# Patient Record
Sex: FEMALE | Race: WHITE | Employment: OTHER | URBAN - METROPOLITAN AREA
[De-identification: names, ages, dates, MRNs, and addresses within clinical notes are randomized per-mention and may not be internally consistent; named-entity substitution may affect disease eponyms.]

---

## 2020-12-14 PROBLEM — S72.141A CLOSED INTERTROCHANTERIC FRACTURE OF HIP, RIGHT, INITIAL ENCOUNTER (HCC): Status: ACTIVE | Noted: 2020-12-14

## 2021-12-31 ENCOUNTER — HOSPITAL ENCOUNTER (INPATIENT)
Age: 76
LOS: 1 days | Discharge: LEFT BEFORE DISCHARGE | DRG: 871 | End: 2022-01-01
Attending: EMERGENCY MEDICINE | Admitting: STUDENT IN AN ORGANIZED HEALTH CARE EDUCATION/TRAINING PROGRAM
Payer: MEDICARE

## 2021-12-31 ENCOUNTER — HOSPITAL ENCOUNTER (INPATIENT)
Dept: INTERVENTIONAL RADIOLOGY/VASCULAR | Age: 76
Discharge: HOME | DRG: 871 | End: 2021-12-31
Attending: PHYSICIAN ASSISTANT
Payer: MEDICARE

## 2021-12-31 VITALS
DIASTOLIC BLOOD PRESSURE: 72 MMHG | HEART RATE: 93 BPM | RESPIRATION RATE: 20 BRPM | SYSTOLIC BLOOD PRESSURE: 118 MMHG | OXYGEN SATURATION: 97 %

## 2021-12-31 DIAGNOSIS — N17.9 SEPSIS WITH ACUTE RENAL FAILURE WITHOUT SEPTIC SHOCK, DUE TO UNSPECIFIED ORGANISM, UNSPECIFIED ACUTE RENAL FAILURE TYPE (HCC): ICD-10-CM

## 2021-12-31 DIAGNOSIS — N17.9 AKI (ACUTE KIDNEY INJURY) (HCC): ICD-10-CM

## 2021-12-31 DIAGNOSIS — A41.9 SEPSIS WITH ACUTE RENAL FAILURE WITHOUT SEPTIC SHOCK, DUE TO UNSPECIFIED ORGANISM, UNSPECIFIED ACUTE RENAL FAILURE TYPE (HCC): ICD-10-CM

## 2021-12-31 DIAGNOSIS — R65.20 SEPSIS WITH ACUTE RENAL FAILURE WITHOUT SEPTIC SHOCK, DUE TO UNSPECIFIED ORGANISM, UNSPECIFIED ACUTE RENAL FAILURE TYPE (HCC): ICD-10-CM

## 2021-12-31 DIAGNOSIS — N13.9 OBSTRUCTIVE UROPATHY: Primary | ICD-10-CM

## 2021-12-31 PROBLEM — N12 PYELONEPHRITIS: Status: ACTIVE | Noted: 2021-12-31

## 2021-12-31 PROBLEM — E87.6 HYPOKALEMIA: Status: ACTIVE | Noted: 2021-12-31

## 2021-12-31 PROBLEM — E86.0 DEHYDRATION: Status: ACTIVE | Noted: 2021-12-31

## 2021-12-31 PROBLEM — N17.0 ATN (ACUTE TUBULAR NECROSIS) (HCC): Status: ACTIVE | Noted: 2021-12-31

## 2021-12-31 PROBLEM — D72.829 LEUCOCYTOSIS: Status: ACTIVE | Noted: 2021-12-31

## 2021-12-31 LAB
ALBUMIN SERPL-MCNC: 2.4 G/DL (ref 3.4–5)
ALBUMIN/GLOB SERPL: 0.7 {RATIO} (ref 0.8–1.7)
ALP SERPL-CCNC: 128 U/L (ref 45–117)
ALT SERPL-CCNC: 9 U/L (ref 13–56)
ANION GAP SERPL CALC-SCNC: 5 MMOL/L (ref 3–18)
APPEARANCE UR: ABNORMAL
APTT PPP: 32.3 SEC (ref 23–36.4)
AST SERPL-CCNC: 10 U/L (ref 10–38)
BACTERIA URNS QL MICRO: ABNORMAL /HPF
BASOPHILS # BLD: 0 K/UL (ref 0–0.1)
BASOPHILS NFR BLD: 0 % (ref 0–2)
BILIRUB SERPL-MCNC: 0.5 MG/DL (ref 0.2–1)
BILIRUB UR QL: NEGATIVE
BUN SERPL-MCNC: 25 MG/DL (ref 7–18)
BUN/CREAT SERPL: 16 (ref 12–20)
CALCIUM SERPL-MCNC: 8.2 MG/DL (ref 8.5–10.1)
CHLORIDE SERPL-SCNC: 113 MMOL/L (ref 100–111)
CO2 SERPL-SCNC: 20 MMOL/L (ref 21–32)
COLOR UR: YELLOW
CREAT SERPL-MCNC: 1.55 MG/DL (ref 0.6–1.3)
DIFFERENTIAL METHOD BLD: ABNORMAL
EOSINOPHIL # BLD: 0 K/UL (ref 0–0.4)
EOSINOPHIL NFR BLD: 0 % (ref 0–5)
EPITH CASTS URNS QL MICRO: ABNORMAL /LPF (ref 0–5)
ERYTHROCYTE [DISTWIDTH] IN BLOOD BY AUTOMATED COUNT: 13.7 % (ref 11.6–14.5)
GLOBULIN SER CALC-MCNC: 3.4 G/DL (ref 2–4)
GLUCOSE SERPL-MCNC: 133 MG/DL (ref 74–99)
GLUCOSE UR STRIP.AUTO-MCNC: NEGATIVE MG/DL
HCT VFR BLD AUTO: 37.1 % (ref 35–45)
HGB BLD-MCNC: 12.2 G/DL (ref 12–16)
HGB UR QL STRIP: ABNORMAL
IMM GRANULOCYTES # BLD AUTO: 0 K/UL (ref 0–0.04)
IMM GRANULOCYTES NFR BLD AUTO: 0 % (ref 0–0.5)
KETONES UR QL STRIP.AUTO: NEGATIVE MG/DL
LEUKOCYTE ESTERASE UR QL STRIP.AUTO: ABNORMAL
LYMPHOCYTES # BLD: 0.5 K/UL (ref 0.9–3.6)
LYMPHOCYTES NFR BLD: 3 % (ref 21–52)
MCH RBC QN AUTO: 29.5 PG (ref 24–34)
MCHC RBC AUTO-ENTMCNC: 32.9 G/DL (ref 31–37)
MCV RBC AUTO: 89.8 FL (ref 78–100)
MONOCYTES # BLD: 1.4 K/UL (ref 0.05–1.2)
MONOCYTES NFR BLD: 8 % (ref 3–10)
NEUTS BAND NFR BLD MANUAL: 1 %
NEUTS SEG # BLD: 15.9 K/UL (ref 1.8–8)
NEUTS SEG NFR BLD: 88 % (ref 40–73)
NITRITE UR QL STRIP.AUTO: POSITIVE
NRBC # BLD: 0 K/UL (ref 0–0.01)
NRBC BLD-RTO: 0 PER 100 WBC
PH UR STRIP: >8.5 [PH] (ref 5–8)
PLATELET # BLD AUTO: 136 K/UL (ref 135–420)
PLATELET COMMENTS,PCOM: ABNORMAL
PMV BLD AUTO: 10.2 FL (ref 9.2–11.8)
POTASSIUM SERPL-SCNC: 3.3 MMOL/L (ref 3.5–5.5)
PROT SERPL-MCNC: 5.8 G/DL (ref 6.4–8.2)
PROT UR STRIP-MCNC: 100 MG/DL
RBC # BLD AUTO: 4.13 M/UL (ref 4.2–5.3)
RBC #/AREA URNS HPF: NEGATIVE /HPF (ref 0–5)
RBC MORPH BLD: ABNORMAL
SODIUM SERPL-SCNC: 138 MMOL/L (ref 136–145)
SP GR UR REFRACTOMETRY: 1.02 (ref 1–1.03)
UROBILINOGEN UR QL STRIP.AUTO: 1 EU/DL (ref 0.2–1)
WBC # BLD AUTO: 17.8 K/UL (ref 4.6–13.2)
WBC URNS QL MICRO: ABNORMAL /HPF (ref 0–4)

## 2021-12-31 PROCEDURE — 87077 CULTURE AEROBIC IDENTIFY: CPT

## 2021-12-31 PROCEDURE — 80053 COMPREHEN METABOLIC PANEL: CPT

## 2021-12-31 PROCEDURE — 74011250637 HC RX REV CODE- 250/637: Performed by: UROLOGY

## 2021-12-31 PROCEDURE — 74011000636 HC RX REV CODE- 636: Performed by: RADIOLOGY

## 2021-12-31 PROCEDURE — 85025 COMPLETE CBC W/AUTO DIFF WBC: CPT

## 2021-12-31 PROCEDURE — 87186 SC STD MICRODIL/AGAR DIL: CPT

## 2021-12-31 PROCEDURE — 85730 THROMBOPLASTIN TIME PARTIAL: CPT

## 2021-12-31 PROCEDURE — 99223 1ST HOSP IP/OBS HIGH 75: CPT | Performed by: STUDENT IN AN ORGANIZED HEALTH CARE EDUCATION/TRAINING PROGRAM

## 2021-12-31 PROCEDURE — 87205 SMEAR GRAM STAIN: CPT

## 2021-12-31 PROCEDURE — 87075 CULTR BACTERIA EXCEPT BLOOD: CPT

## 2021-12-31 PROCEDURE — 99284 EMERGENCY DEPT VISIT MOD MDM: CPT

## 2021-12-31 PROCEDURE — 74011250636 HC RX REV CODE- 250/636: Performed by: INTERNAL MEDICINE

## 2021-12-31 PROCEDURE — 74011250636 HC RX REV CODE- 250/636: Performed by: PHYSICIAN ASSISTANT

## 2021-12-31 PROCEDURE — 74011000250 HC RX REV CODE- 250: Performed by: STUDENT IN AN ORGANIZED HEALTH CARE EDUCATION/TRAINING PROGRAM

## 2021-12-31 PROCEDURE — 65270000029 HC RM PRIVATE

## 2021-12-31 PROCEDURE — 74011000250 HC RX REV CODE- 250: Performed by: RADIOLOGY

## 2021-12-31 PROCEDURE — 87086 URINE CULTURE/COLONY COUNT: CPT

## 2021-12-31 PROCEDURE — 74011250637 HC RX REV CODE- 250/637: Performed by: STUDENT IN AN ORGANIZED HEALTH CARE EDUCATION/TRAINING PROGRAM

## 2021-12-31 PROCEDURE — 74011250636 HC RX REV CODE- 250/636: Performed by: STUDENT IN AN ORGANIZED HEALTH CARE EDUCATION/TRAINING PROGRAM

## 2021-12-31 PROCEDURE — 81001 URINALYSIS AUTO W/SCOPE: CPT

## 2021-12-31 PROCEDURE — 50432 PLMT NEPHROSTOMY CATHETER: CPT

## 2021-12-31 PROCEDURE — 2709999900 HC NON-CHARGEABLE SUPPLY

## 2021-12-31 PROCEDURE — 0T9130Z DRAINAGE OF LEFT KIDNEY WITH DRAINAGE DEVICE, PERCUTANEOUS APPROACH: ICD-10-PCS | Performed by: SURGERY

## 2021-12-31 RX ORDER — LIDOCAINE HYDROCHLORIDE 10 MG/ML
30 INJECTION, SOLUTION EPIDURAL; INFILTRATION; INTRACAUDAL; PERINEURAL ONCE
Status: COMPLETED | OUTPATIENT
Start: 2021-12-31 | End: 2021-12-31

## 2021-12-31 RX ORDER — POTASSIUM CHLORIDE 20 MEQ/1
20 TABLET, EXTENDED RELEASE ORAL 2 TIMES DAILY
Status: DISCONTINUED | OUTPATIENT
Start: 2021-12-31 | End: 2021-12-31

## 2021-12-31 RX ORDER — MIDAZOLAM HYDROCHLORIDE 1 MG/ML
.5-2 INJECTION, SOLUTION INTRAMUSCULAR; INTRAVENOUS
Status: DISPENSED | OUTPATIENT
Start: 2021-12-31 | End: 2021-12-31

## 2021-12-31 RX ORDER — ACETAMINOPHEN 650 MG/1
650 SUPPOSITORY RECTAL
Status: DISCONTINUED | OUTPATIENT
Start: 2021-12-31 | End: 2022-01-01 | Stop reason: HOSPADM

## 2021-12-31 RX ORDER — MORPHINE SULFATE 2 MG/ML
2 INJECTION, SOLUTION INTRAMUSCULAR; INTRAVENOUS
Status: DISCONTINUED | OUTPATIENT
Start: 2021-12-31 | End: 2022-01-01 | Stop reason: HOSPADM

## 2021-12-31 RX ORDER — SODIUM CHLORIDE 0.9 % (FLUSH) 0.9 %
5-40 SYRINGE (ML) INJECTION AS NEEDED
Status: DISCONTINUED | OUTPATIENT
Start: 2021-12-31 | End: 2022-01-01 | Stop reason: HOSPADM

## 2021-12-31 RX ORDER — CITALOPRAM 20 MG/1
20 TABLET, FILM COATED ORAL DAILY
Status: DISCONTINUED | OUTPATIENT
Start: 2021-12-31 | End: 2022-01-01 | Stop reason: HOSPADM

## 2021-12-31 RX ORDER — IODIXANOL 320 MG/ML
50 INJECTION, SOLUTION INTRAVASCULAR
Status: COMPLETED | OUTPATIENT
Start: 2021-12-31 | End: 2021-12-31

## 2021-12-31 RX ORDER — METOPROLOL TARTRATE 50 MG/1
50 TABLET ORAL EVERY 12 HOURS
Status: DISCONTINUED | OUTPATIENT
Start: 2021-12-31 | End: 2022-01-01 | Stop reason: HOSPADM

## 2021-12-31 RX ORDER — NALOXONE HYDROCHLORIDE 0.4 MG/ML
0.2 INJECTION, SOLUTION INTRAMUSCULAR; INTRAVENOUS; SUBCUTANEOUS
Status: DISCONTINUED | OUTPATIENT
Start: 2021-12-31 | End: 2022-01-04 | Stop reason: HOSPADM

## 2021-12-31 RX ORDER — SODIUM CHLORIDE 9 MG/ML
100 INJECTION, SOLUTION INTRAVENOUS CONTINUOUS
Status: DISCONTINUED | OUTPATIENT
Start: 2021-12-31 | End: 2021-12-31

## 2021-12-31 RX ORDER — ATORVASTATIN CALCIUM 20 MG/1
20 TABLET, FILM COATED ORAL
Status: DISCONTINUED | OUTPATIENT
Start: 2021-12-31 | End: 2022-01-01 | Stop reason: HOSPADM

## 2021-12-31 RX ORDER — SODIUM CHLORIDE AND POTASSIUM CHLORIDE .9; .15 G/100ML; G/100ML
SOLUTION INTRAVENOUS CONTINUOUS
Status: DISCONTINUED | OUTPATIENT
Start: 2021-12-31 | End: 2022-01-01 | Stop reason: HOSPADM

## 2021-12-31 RX ORDER — ACETAMINOPHEN 325 MG/1
650 TABLET ORAL
Status: DISCONTINUED | OUTPATIENT
Start: 2021-12-31 | End: 2022-01-01 | Stop reason: HOSPADM

## 2021-12-31 RX ORDER — ONDANSETRON 2 MG/ML
4 INJECTION INTRAMUSCULAR; INTRAVENOUS
Status: DISCONTINUED | OUTPATIENT
Start: 2021-12-31 | End: 2022-01-01 | Stop reason: HOSPADM

## 2021-12-31 RX ORDER — OXYBUTYNIN CHLORIDE 5 MG/1
5 TABLET ORAL 2 TIMES DAILY
Status: DISCONTINUED | OUTPATIENT
Start: 2021-12-31 | End: 2022-01-01 | Stop reason: HOSPADM

## 2021-12-31 RX ORDER — POLYETHYLENE GLYCOL 3350 17 G/17G
17 POWDER, FOR SOLUTION ORAL DAILY PRN
Status: DISCONTINUED | OUTPATIENT
Start: 2021-12-31 | End: 2022-01-01 | Stop reason: HOSPADM

## 2021-12-31 RX ORDER — LIDOCAINE HYDROCHLORIDE 10 MG/ML
INJECTION, SOLUTION EPIDURAL; INFILTRATION; INTRACAUDAL; PERINEURAL
Status: DISCONTINUED
Start: 2021-12-31 | End: 2022-01-01 | Stop reason: HOSPADM

## 2021-12-31 RX ORDER — SODIUM CHLORIDE 0.9 % (FLUSH) 0.9 %
5-40 SYRINGE (ML) INJECTION EVERY 8 HOURS
Status: DISCONTINUED | OUTPATIENT
Start: 2021-12-31 | End: 2022-01-01 | Stop reason: HOSPADM

## 2021-12-31 RX ORDER — TAMSULOSIN HYDROCHLORIDE 0.4 MG/1
0.4 CAPSULE ORAL DAILY
Status: DISCONTINUED | OUTPATIENT
Start: 2021-12-31 | End: 2022-01-01 | Stop reason: HOSPADM

## 2021-12-31 RX ORDER — FENTANYL CITRATE 50 UG/ML
12.5-5 INJECTION, SOLUTION INTRAMUSCULAR; INTRAVENOUS
Status: DISPENSED | OUTPATIENT
Start: 2021-12-31 | End: 2021-12-31

## 2021-12-31 RX ORDER — ONDANSETRON 4 MG/1
4 TABLET, ORALLY DISINTEGRATING ORAL
Status: DISCONTINUED | OUTPATIENT
Start: 2021-12-31 | End: 2022-01-01 | Stop reason: HOSPADM

## 2021-12-31 RX ORDER — FLUMAZENIL 0.1 MG/ML
0.2 INJECTION INTRAVENOUS
Status: DISCONTINUED | OUTPATIENT
Start: 2021-12-31 | End: 2022-01-01 | Stop reason: HOSPADM

## 2021-12-31 RX ADMIN — SODIUM CHLORIDE 100 ML/HR: 900 INJECTION, SOLUTION INTRAVENOUS at 08:30

## 2021-12-31 RX ADMIN — MIDAZOLAM HYDROCHLORIDE 1 MG: 1 INJECTION, SOLUTION INTRAMUSCULAR; INTRAVENOUS at 12:10

## 2021-12-31 RX ADMIN — IODIXANOL 50 ML: 320 INJECTION, SOLUTION INTRAVASCULAR at 12:15

## 2021-12-31 RX ADMIN — OXYBUTYNIN CHLORIDE 5 MG: 5 TABLET ORAL at 17:09

## 2021-12-31 RX ADMIN — MIDAZOLAM HYDROCHLORIDE 1 MG: 1 INJECTION, SOLUTION INTRAMUSCULAR; INTRAVENOUS at 12:00

## 2021-12-31 RX ADMIN — ATORVASTATIN CALCIUM 20 MG: 20 TABLET, FILM COATED ORAL at 21:16

## 2021-12-31 RX ADMIN — FENTANYL CITRATE 50 MCG: 50 INJECTION INTRAMUSCULAR; INTRAVENOUS at 12:00

## 2021-12-31 RX ADMIN — METOPROLOL TARTRATE 50 MG: 50 TABLET, FILM COATED ORAL at 08:28

## 2021-12-31 RX ADMIN — CITALOPRAM HYDROBROMIDE 20 MG: 20 TABLET ORAL at 08:27

## 2021-12-31 RX ADMIN — TAMSULOSIN HYDROCHLORIDE 0.4 MG: 0.4 CAPSULE ORAL at 08:27

## 2021-12-31 RX ADMIN — Medication 10 ML: at 21:19

## 2021-12-31 RX ADMIN — Medication 10 ML: at 14:09

## 2021-12-31 RX ADMIN — WATER 1 G: 1 INJECTION INTRAMUSCULAR; INTRAVENOUS; SUBCUTANEOUS at 08:28

## 2021-12-31 RX ADMIN — LIDOCAINE HYDROCHLORIDE 30 ML: 10 INJECTION, SOLUTION EPIDURAL; INFILTRATION; INTRACAUDAL; PERINEURAL at 12:00

## 2021-12-31 RX ADMIN — POTASSIUM CHLORIDE AND SODIUM CHLORIDE: 900; 150 INJECTION, SOLUTION INTRAVENOUS at 17:09

## 2021-12-31 RX ADMIN — FENTANYL CITRATE 50 MCG: 50 INJECTION INTRAMUSCULAR; INTRAVENOUS at 12:10

## 2021-12-31 RX ADMIN — METOPROLOL TARTRATE 50 MG: 50 TABLET, FILM COATED ORAL at 21:16

## 2021-12-31 NOTE — ROUTINE PROCESS
TRANSFER - IN REPORT:    Verbal report received from Zachariah (name) on Shawn Restrepo  being received from [facility](unit) for routine progression of care      Report consisted of patients Situation, Background, Assessment and   Recommendations(SBAR). Information from the following report(s) SBAR, Kardex, Intake/Output and Recent Results was reviewed with the receiving nurse. Opportunity for questions and clarification was provided. Assessment completed upon patients arrival to unit and care assumed.

## 2021-12-31 NOTE — PROGRESS NOTES
TRANSFER - OUT REPORT:    Verbal report given to [facility] ELVIN VIEIRA RN(name) on Stephen Espinoza  being transferred to (unit) for routine post - op       Report consisted of patients Situation, Background, Assessment and   Recommendations(SBAR). Information from the following report(s) SBAR, Kardex, Procedure Summary and MAR was reviewed with the receiving nurse. Lines:   Peripheral IV 12/31/21 Left Antecubital (Active)       Peripheral IV 12/31/21 Right Hand (Active)   Site Assessment Clean, dry, & intact 12/31/21 0537   Phlebitis Assessment 0 12/31/21 0537   Infiltration Assessment 0 12/31/21 0537   Dressing Status Clean, dry, & intact 12/31/21 0537   Dressing Type 4 X 4;Tape;Transparent 12/31/21 0537   Hub Color/Line Status Blue;Flushed;Patent 12/31/21 2250   Action Taken Blood drawn; Wrapped 12/31/21 0537   Alcohol Cap Used No 12/31/21 0537        Opportunity for questions and clarification was provided.       Patient transported with:   Registered Nurse  Tech

## 2021-12-31 NOTE — PROGRESS NOTES
Follow up rounding. She was admitted this AM by Dr. Nikita Brown. She has been transferred from [facility] for obstructive nephropathy   She is s/p left PCN placement per IR  Finding include pyelonephritis with pus aspirated from left kidney. Nephrology has been consulted for further care. She remains on IV fluid, IV antibiotics.  Culture follow up for antibiotic selection  Urology to follow up for renal stone       Signed By: Flash Rodrigez MD     December 31, 2021

## 2021-12-31 NOTE — PROGRESS NOTES
completed the initial Spiritual Assessment of the patient in bed H3 of the emergency room and offered Pastoral Care support as well as prayer. Patient does not have an advance directive at this time. Patient does not have any Episcopal/cultural needs that will affect patients preferences in health care. Chaplains will continue to follow and will provide pastoral care on an as needed/requested basis.     [facility] Department  [phone]

## 2021-12-31 NOTE — PROCEDURES
RADIOLOGY POST PROCEDURE NOTE     December 31, 2021       2:25 PM     Preoperative Diagnosis:   Pyelonephritis    Postoperative Diagnosis:  Same. : Mir Choi MD    Assistant:  None. Type of Anesthesia: Moderate Sedation    Procedure/Description:  Left PCN placement    Findings:   Pyelonephritis with 30cc of pus aspirated from the kidney. 10Fr drain placed. Estimated blood Loss:  None    Specimen Removed:   yes    Blood transfusions:  None. Implants:  None. Complications: None    Condition: Stable    Discharge Plan:  continue present therapy    ALTA Choi MD  Interventional Radiology  12/31/21  2:25 PM

## 2021-12-31 NOTE — ED NOTES
Pt's O2 decreased to 79% while pt sleeping. Placed pt on 2 liters NC.   Pt's O2 sats increased to 94%

## 2021-12-31 NOTE — ED NOTES
TRANSFER - OUT REPORT:    Verbal report given to Colleen(name) on Eduardo Robert  being transferred to [facility](unit) for routine progression of care       Report consisted of patients Situation, Background, Assessment and   Recommendations(SBAR). Information from the following report(s) SBAR, ED Summary and MAR was reviewed with the receiving nurse. Lines:   Peripheral IV 12/31/21 Left Antecubital (Active)       Peripheral IV 12/31/21 Right Hand (Active)   Site Assessment Clean, dry, & intact 12/31/21 0537   Phlebitis Assessment 0 12/31/21 0537   Infiltration Assessment 0 12/31/21 0537   Dressing Status Clean, dry, & intact 12/31/21 0537   Dressing Type 4 X 4;Tape;Transparent 12/31/21 0537   Hub Color/Line Status Blue;Flushed;Patent 12/31/21 4020   Action Taken Blood drawn; Wrapped 12/31/21 0537   Alcohol Cap Used No 12/31/21 0537        Opportunity for questions and clarification was provided.       Patient transported with:   [employer]

## 2021-12-31 NOTE — H&P
History & Physical    Patient: Patience Houston MRN: [id number]  CSN: [id number]    YOB: 1945  Age: 64 y.o. Sex: female      DOA: 12/31/2021    Chief Complaint:   Chief Complaint   Patient presents with    Acute Kidney Injury          HPI:     Patience Houston is a 64 y.o.  female with hx of bladder cancer status post urostomy, sick sinus syndrome status post pacemaker, hypertension, COPD. Patient presented to [facility] ED from [facility] in [facility]. She was in ED to ED transfer as there was no available beds in that facility. Patient has been experiencing approximately 3 days of nausea vomiting anorexia. CT abdomen pelvis showed 5 mm stone within the left ureter. Abnormal appearance to the left kidney with patchy striations. Findings could reflect decreased renal function from obstruction or possible ATN. Dr. Keyana Quezada (urology) contacted by ED. Upon interview, patient is currently comfortable and without acute concerns. She did desat to 79% while sleeping and was placed on 2 L nasal cannula with improvement up to 94%. Past Medical History:   Diagnosis Date    Bladder cancer ([address]     Chronic obstructive pulmonary disease ([address]     Depression     Hypertension     Sick sinus syndrome (HCC)        Past Surgical History:   Procedure Laterality Date    DILATION AND CURETTAGE      HX APPENDECTOMY      HX UROLOGICAL      Bladder resection with ileal conduit reconstruction for ureterostomy's       Family History   Problem Relation Age of Onset    Hypertension Mother     Stroke Mother    [facility] COPD Mother     Kidney Disease Mother     Cancer Father         Prostate    Hypertension Sister     Other Brother         Hodgkin's lymphoma       Social History     Socioeconomic History    Marital status: SINGLE   Tobacco Use    Smoking status: Current Every Day Smoker     Packs/day: 1.50    Tobacco comment: since 14 yrs. old   Substance and Sexual Activity    Alcohol use: Yes     Comment: occational       Prior to Admission medications    Medication Sig Start Date End Date Taking? Authorizing Provider   acetaminophen (TYLENOL) 500 mg tablet Take 1 Tab by mouth every six (6) hours as needed for Pain (mild pain). 12/21/20   Fabiola Bronson MD   aspirin delayed-release 81 mg tablet Take 1 Tab by mouth two (2) times daily (after meals). Indications: extended dvt prophylaxis 12/29/20   Fabiola Bronson MD   atorvastatin (LIPITOR) 20 mg tablet Take 1 Tab by mouth nightly. 12/21/20   Fabiola Bronson MD   citalopram (CELEXA) 20 mg tablet Take 1 Tab by mouth daily. 12/22/20   Carmella Peter MD   metoprolol tartrate (LOPRESSOR) 50 mg tablet Take 1 Tab by mouth every twelve (12) hours. 12/21/20   Fabiola Bronson MD   polyethylene glycol (MIRALAX) 17 gram packet Take 1 Packet by mouth daily as needed for Constipation. 12/21/20   Carmella Peter MD   oxybutynin (DITROPAN) 5 mg tablet Take 1 Tab by mouth two (2) times a day. 1/11/13   Becca Ivory MD   clobetasol (TEMOVATE) 0.05 % ointment Apply  to affected area two (2) times daily as needed for Skin Irritation or Itching. 1/11/13   Amadou Sutton MD   phenazopyridine (PYRIDIUM) 200 mg tablet 1TIDPRNPA - TAKE ONE TABLET BY MOUTH THREE TIMES DAILY AS NEEDED FOR PAIN 1/2/11   Becca Ivory MD       Allergies   Allergen Reactions    Tetracycline Unknown (comments)         Review of Systems  GENERAL: No fever, chills, malaise  HEENT: No sore throat or sinus congestion. NECK: No pain or stiffness. PULMONARY: No shortness of breath, cough or wheeze. Cardiovascular: no CP  GASTROINTESTINAL: No abdominal pain, no current nausea, no current vomiting  GENITOURINARY: No urinary frequency,  dysuria. MUSCULOSKELETAL: No arthralgias  DERMATOLOGIC: No rash, no itching, no lesions. ENDOCRINE:  No recent change in weight. HEMATOLOGICAL: No anemia or easy bruising or bleeding.    NEUROLOGIC: No headache, seizures, numbness, tingling or weakness. Physical Exam:     Physical Exam:  Visit Vitals  BP (!) 142/69 (BP 1 Location: Right upper arm, BP Patient Position: At rest)   Pulse (!) 108   Temp 98.5 °F (36.9 °C)   Resp 18   Ht 5' 6\" (1.676 m)   Wt 63.5 kg (140 lb)   SpO2 91%   BMI 22.60 kg/m²      O2 Device: None (Room air)    Temp (24hrs), Av.5 °F (36.9 °C), Min:98.5 °F (36.9 °C), Max:98.5 °F (36.9 °C)    No intake/output data recorded. No intake/output data recorded. General:  Alert, cooperative, no distress, appears stated age. Head: Normocephalic, without obvious abnormality, atraumatic. Eyes:  Conjunctivae/corneas clear. PERRL, EOMs intact. Nose: Nares normal.    Neck: Supple, symmetrical, trachea midline   Lungs:   Clear to auscultation bilaterally. Decreased air movement. On 2 L nasal cannula. Heart:  Regular rate and rhythm, S1, S2 normal.     Abdomen: Soft, non-tender. Nondistended   Extremities: Extremities normal, atraumatic, no cyanosis or edema. Pulses: 2+ and symmetric all extremities. Skin:  No rashes or lesions   Neurologic: AAOx3, No focal motor or sensory deficit. Labs Reviewed: All lab results for the last 24 hours reviewed. Recent Results (from the past 24 hour(s))   CBC WITH AUTOMATED DIFF    Collection Time: 21  5:17 AM   Result Value Ref Range    WBC 17.8 (H) 4.6 - 13.2 K/uL    RBC 4.13 (L) 4.20 - 5.30 M/uL    HGB 12.2 12.0 - 16.0 g/dL    HCT 37.1 35.0 - 45.0 %    MCV 89.8 78.0 - 100.0 FL    MCH 29.5 24.0 - 34.0 PG    MCHC 32.9 31.0 - 37.0 g/dL    RDW 13.7 11.6 - 14.5 %    PLATELET 746 603 - 393 K/uL    MPV 10.2 9.2 - 11.8 FL    NRBC 0.0 0  WBC    ABSOLUTE NRBC 0.00 0.00 - 0.01 K/uL    NEUTROPHILS 88 (H) 40 - 73 %    BAND NEUTROPHILS 1 %    LYMPHOCYTES 3 (L) 21 - 52 %    MONOCYTES 8 3 - 10 %    EOSINOPHILS 0 0 - 5 %    BASOPHILS 0 0 - 2 %    IMMATURE GRANULOCYTES 0 0.0 - 0.5 %    ABS. NEUTROPHILS 15.9 (H) 1.8 - 8.0 K/UL    ABS.  LYMPHOCYTES 0.5 (L) 0.9 - 3.6 K/UL    ABS. MONOCYTES 1.4 (H) 0.05 - 1.2 K/UL    ABS. EOSINOPHILS 0.0 0.0 - 0.4 K/UL    ABS. BASOPHILS 0.0 0.0 - 0.1 K/UL    ABS. IMM. GRANS. 0.0 0.00 - 0.04 K/UL    DF MANUAL      PLATELET COMMENTS ADEQUATE PLATELETS      RBC COMMENTS NORMOCYTIC, NORMOCHROMIC     METABOLIC PANEL, COMPREHENSIVE    Collection Time: 12/31/21  5:17 AM   Result Value Ref Range    Sodium 138 136 - 145 mmol/L    Potassium 3.3 (L) 3.5 - 5.5 mmol/L    Chloride 113 (H) 100 - 111 mmol/L    CO2 20 (L) 21 - 32 mmol/L    Anion gap 5 3.0 - 18 mmol/L    Glucose 133 (H) 74 - 99 mg/dL    BUN 25 (H) 7.0 - 18 MG/DL    Creatinine 1.55 (H) 0.6 - 1.3 MG/DL    BUN/Creatinine ratio 16 12 - 20      GFR est AA 39 (L) >60 ml/min/1.73m2    GFR est non-AA 32 (L) >60 ml/min/1.73m2    Calcium 8.2 (L) 8.5 - 10.1 MG/DL    Bilirubin, total 0.5 0.2 - 1.0 MG/DL    ALT (SGPT) 9 (L) 13 - 56 U/L    AST (SGOT) 10 10 - 38 U/L    Alk. phosphatase 128 (H) 45 - 117 U/L    Protein, total 5.8 (L) 6.4 - 8.2 g/dL    Albumin 2.4 (L) 3.4 - 5.0 g/dL    Globulin 3.4 2.0 - 4.0 g/dL    A-G Ratio 0.7 (L) 0.8 - 1.7          XR Results (most recent):  Results from Hospital Encounter encounter on 12/13/20    XR FLUOROSCOPY UNDER 60 MINUTES    Narrative  XR FLUOROSCOPY UNDER 60 MINUTES    INDICATION: RT HIP.  COMPARISON: No relevant studies. Fluoroscopic time: 59.2 seconds  Images: 2    Impression  FINDINGS/IMPRESSION:    Fluoroscopic assistance was provided. Radiologist was not present during the  procedure. Status post internal fixation of right femur with femoral neck screw  and short intramedullary stem with distal interlocking screw. Please refer to  operative note for additional findings.         CT Results  (Last 48 hours)    STUDY: CT of the abdomen and pelvis without IV contrast     INDICATION: Left flank pain     COMPARISON: 1/22/2020     FINDINGS: Routine CT images are obtained through the abdomen and pelvis without IV contrast. However, history does indicate the patient had a prior CT scan in the last couple days although not available on PACS archives. The liver, spleen, pancreas, gallbladder, adrenal glands appear unremarkable. The right kidney is unremarkable. There is a markedly abnormal appearance to the left kidney. There is patchy striated appearance with high density probably reflecting prior contrast. There is an approximate 5 mm stone within the left mid ureter with mild obstructive changes. The patient is status post cystectomy with ilial conduit. The bowel loops are nondistended. There is contrast within portions of the bowel presumed from prior diagnostic CT. Procedures/imaging: see electronic medical records for all procedures/Xrays and details which were not copied into this note but were reviewed prior to creation of Plan      Assessment/Plan     Active Problems:    Obstructive uropathy (12/31/2021)       Assessment  1. Sepsis -tachycardia, leukocytosis with probable renal source  2. Started uropathy -patient with 5 mm stone in left ureter  3. Elevated creatinine on probable CKD 3a. Creatinine 1.4 on December 28 [facility] records. 4. History of bladder cancer status post cystectomy with ilial conduit   5. Hypokalemia   #1-5. Admit. Start Rocephin. Nephrology consultation. CT read did state that there was findings that may have been consistent with ATN, wish to rule this out. N.p.o.  Morphine as needed for pain. Urology has been consulted. Patient n.p.o.  6.   Covid negative    Diet: NPO  DVT/GI Prophylaxis: SCD's  Code Status: full   Contact:    Discussed with patient at bedside about hospital admission and plan care. He understands and agrees. All questions answered. Disclaimer: Sections of this note are dictated using utilizing voice recognition software. Minor typographical errors may be present. If questions arise, please do not hesitate to contact me or call our department.         Deepti John, [facility] OF THE [facility]

## 2021-12-31 NOTE — Clinical Note
Status[de-identified] INPATIENT [101]   Type of Bed: Medical [8]   Inpatient Hospitalization Certified Necessary for the Following Reasons: 3.  Patient receiving treatment that can only be provided in an inpatient setting (further clarification in H&P documentation)   Admitting Diagnosis: Obstructive uropathy [956369]   Admitting Physician: Shelbi Rodriguez [[id number]]   Attending Physician: Shelbi Rodriguez [[id number]]   Estimated Length of Stay: 3-4 Midnights   Discharge Plan[de-identified] Home with Office Follow-up

## 2021-12-31 NOTE — ED TRIAGE NOTES
Pt arrived via Medical Transport. Pt transferred from [facility] in [place], [place]. Pt complaining of N/V, inability to eat or drink times 3 days. Pt has history of bladder cancer, and urostomy. Per report from Garcia Aldrich RN, Pt has a 5mm stone in left ureter, with left kidney abnormality according to CT report. Pt arrived with 20g left AC IV with D5 NS/ 20meq Potassium infusing at 125mL/hr. Dr. Parrish Humphries and Dr. Diane Larkin accepted pt transfer.

## 2021-12-31 NOTE — CONSULTS
Interventional Radiology Consult Note  Patient: Keily Watson               Sex: female          DOA: 12/31/2021       YOB: 1945      Age:  64 y.o.        LOS:  LOS: 0 days              Assessment   Left obstructive uropathy with concerns for pyelonephritis given persistent leukocytosis and CT imaging from 12/28/21    Hx bladder cancer with urostomy in place    Left nephrostomy placement is needed at this time to aid in management of obstructive uropathy as well as source control for possible pyelonephritis. Case and images reviewed by Dr. Melina Howard. Discussed with Dr. Anya Evans over the phone. Plan     1. Image guided left nephrostomy as schedule allows  2. NPO with blood thinning medications held prior to procedure  3. Additional specimen orders per referring team  4. Nephrostomy management per Urology    Thank you,  Kelsey Stockma  [phone]    HPI:     Keily Watson is a 64 y.o. female who has been seen in evaluation of left hydronephrosis with leukocytosis at the request of Dr. Anya Evans. Keily Watson presented to [facility] 12/31/21 as a transfer from [facility] in [place], [place] due to left renal abnormality demonstrated on [facility] CT imaging 12/28/21 and on CT from [facility] 12/31/21. Her labs show continued leukocytosis, with WBC 20.8 12/28/21 to 17.8. Crt from 1.4 to 1.55. today. Interview is obtained in the ER. The patient reports that she has been nauseous and has not been able to eat anything for 3 days due to vomiting. Last emesis Wednesday 12/29. She reports fever and chills, malaise and back pain. The patient denies flank pain, abdominal pain, easy bruising, easy bleeding, HA, dizziness, confusion. The anticipated procedure was discussed in detail including risk of injury, infection, and bleeding. All questions were answered and concerns addressed. Informed consents were obtained.     Past Medical History:   Diagnosis Date    Bl[address] [address]     Chronic obstructive pulmonary disease [address] [address]     Depression     Hypertension     Sick sinus syndrome (HCC)      Past Surgical History:   Procedure Laterality Date    DILATION AND CURETTAGE      HX APPENDECTOMY      HX UROLOGICAL      Bladder resection with ileal conduit reconstruction for ureterostomy's     Family History   Problem Relation Age of Onset    Hypertension Mother     Stroke Mother    Ashely Laura COPD Mother     Kidney Disease Mother     Cancer Father         Prostate    Hypertension Sister     Other Brother         Hodgkin's lymphoma     Social History     Socioeconomic History    Marital status: SINGLE   Tobacco Use    Smoking status: Current Every Day Smoker     Packs/day: 1.50    Tobacco comment: since 14 yrs. old   Substance and Sexual Activity    Alcohol use: Yes     Comment: occational     Prior to Admission medications    Medication Sig Start Date End Date Taking? Authorizing Provider   acetaminophen (TYLENOL) 500 mg tablet Take 1 Tab by mouth every six (6) hours as needed for Pain (mild pain). 12/21/20  Yes Jewels Hdz MD   aspirin delayed-release 81 mg tablet Take 1 Tab by mouth two (2) times daily (after meals). Indications: extended dvt prophylaxis 12/29/20  Yes Jewels Hdz MD   atorvastatin (LIPITOR) 20 mg tablet Take 1 Tab by mouth nightly. 12/21/20  Yes Jewels Hdz MD   citalopram (CELEXA) 20 mg tablet Take 1 Tab by mouth daily. 12/22/20  Yes Jewels Hdz MD   metoprolol tartrate (LOPRESSOR) 50 mg tablet Take 1 Tab by mouth every twelve (12) hours. 12/21/20  Yes Jewels Hdz MD   oxybutynin (DITROPAN) 5 mg tablet Take 1 Tab by mouth two (2) times a day. 1/11/13  Yes René Baltazar MD   polyethylene glycol (MIRALAX) 17 gram packet Take 1 Packet by mouth daily as needed for Constipation. 12/21/20   Yoan Peter MD   clobetasol (TEMOVATE) 0.05 % ointment Apply  to affected area two (2) times daily as needed for Skin Irritation or Itching.  1/11/13   René Baltazar MD phenazopyridine (PYRIDIUM) 200 mg tablet 1TIDPRNPA - TAKE ONE TABLET BY MOUTH THREE TIMES DAILY AS NEEDED FOR PAIN 1/2/11   Tiffany POOLE MD     Allergies   Allergen Reactions    Tetracycline Unknown (comments)     Review of Systems  As above    Physical Exam:      Visit Vitals  /72   Pulse 93   Temp 98.5 °F (36.9 °C)   Resp 19   Ht 5' 6\" (1.676 m)   Wt 63.5 kg (140 lb)   SpO2 96%   BMI 22.60 kg/m²     Physical Exam:  Constitutional: Awake and alert and oriented x 4, NAD  Respiratory: Normal work of breathing, equal chest rise and fall  Cardiovascular: RRR  Gastrointestinal: Soft NT ND  Extremities: Skin warm and dry, moves all four    Labs Reviewed:  CMP:   Lab Results   Component Value Date/Time     12/31/2021 05:17 AM    K 3.3 (L) 12/31/2021 05:17 AM     (H) 12/31/2021 05:17 AM    CO2 20 (L) 12/31/2021 05:17 AM    AGAP 5 12/31/2021 05:17 AM     (H) 12/31/2021 05:17 AM    BUN 25 (H) 12/31/2021 05:17 AM    CREA 1.55 (H) 12/31/2021 05:17 AM    GFRAA 39 (L) 12/31/2021 05:17 AM    GFRNA 32 (L) 12/31/2021 05:17 AM    CA 8.2 (L) 12/31/2021 05:17 AM    ALB 2.4 (L) 12/31/2021 05:17 AM    TP 5.8 (L) 12/31/2021 05:17 AM    GLOB 3.4 12/31/2021 05:17 AM    AGRAT 0.7 (L) 12/31/2021 05:17 AM    ALT 9 (L) 12/31/2021 05:17 AM     CBC:   Lab Results   Component Value Date/Time    WBC 17.8 (H) 12/31/2021 05:17 AM    HGB 12.2 12/31/2021 05:17 AM    HCT 37.1 12/31/2021 05:17 AM     12/31/2021 05:17 AM     COAGS:   Lab Results   Component Value Date/Time    APTT 32.3 12/31/2021 05:17 AM     Blood Thinners:  ASA 81 mg

## 2021-12-31 NOTE — PROGRESS NOTES
Consult Note  Consult requested by: GIOVANNY BARON  Chuy Benitez is a 64 y.o. female  who is being seen on consult for Acute Renal Failure. Chief Complaint   Patient presents with    Acute Kidney Injury     Admission diagnosis: ARF. Left Hydronephrosis. Pyelonephritis. Hypokalemia. ATN.    HPI: Chuy Benitez is a 64 y.o.  female with hx of bladder cancer status post urostomy, sick sinus syndrome status post pacemaker, hypertension, COPD. Patient presented to [facility] ED from [facility] in [facility]. She was in ED to ED transfer as there was no available beds in that facility. Patient has been experiencing approximately 3 days of nausea vomiting anorexia. CT abdomen pelvis showed 5 mm stone within the left ureter. Abnormal appearance to the left kidney with patchy striations. Findings could reflect decreased renal function from obstruction or possible ATN. As per her daughter she has history of Bilateral Nephrostomy tubes in the past, now has Left sided nephrostomy done today. She was told that stone may be be obstructive. In [facility] has undergone several CT  studies with & Without contrast..  Not sure any h/oNSAIDS,before admission she was having Vomiting. Now has undergone Left Nephrostomy tube placement & 30 cc pus came out      Past Medical History:   Diagnosis Date    Arthritis     Knees, Back, Hands    Bladder cancer ([address]     Chronic obstructive pulmonary disease (HCC)     Depression     Hypertension     Sick sinus syndrome ([address]      Past Surgical History:   Procedure Laterality Date    DILATION AND CURETTAGE      HX APPENDECTOMY      HX GYN      Hysterctomy    HX ORTHOPAEDIC      Ankle     HX OTHER SURGICAL      Left ankle plate and screws.      HX PACEMAKER      HX UROLOGICAL      Bladder resection with ileal conduit reconstruction for ureterostomy's    PELVIS/HIP JOINT SURGERY UNLISTED      Plate and screws right hip    AK ABDOMEN SURGERY PROC UNLISTED      Urostomy    AK ABDOMEN SURGERY PROC UNLISTED      Apendectomy    AK CARDIAC SURG PROCEDURE UNLIST           Family History   Problem Relation Age of Onset    Hypertension Mother      Stroke Mother      COPD Mother      Kidney Disease Mother      Cancer Father           Prostate    Hypertension Sister      Other Brother           Hodgkin's lymphoma     Social History   []Expand by DATY            Socioeconomic History    Marital status: SINGLE   Tobacco Use    Smoking status: Current Every Day Smoker       Packs/day: 1.50    Tobacco comment: since 14 yrs. old   Substance and Sexual Activity    Alcohol use: Yes       Comment: occational            Allergies   Allergen Reactions    Tetracycline Unknown (comments)       Home Medications:     Prior to Admission medications    Medication Sig Start Date End Date Taking? Authorizing Provider   acetaminophen (TYLENOL) 500 mg tablet Take 1 Tab by mouth every six (6) hours as needed for Pain (mild pain). 12/21/20     Fabiola Bronson MD   aspirin delayed-release 81 mg tablet Take 1 Tab by mouth two (2) times daily (after meals). Indications: extended dvt prophylaxis 12/29/20     Fabiola Bronson MD   atorvastatin (LIPITOR) 20 mg tablet Take 1 Tab by mouth nightly. 12/21/20     Fabiola Bronson MD   citalopram (CELEXA) 20 mg tablet Take 1 Tab by mouth daily. 12/22/20     Meghan Peter MD   metoprolol tartrate (LOPRESSOR) 50 mg tablet Take 1 Tab by mouth every twelve (12) hours. 12/21/20     Fabiola Bronson MD   polyethylene glycol (MIRALAX) 17 gram packet Take 1 Packet by mouth daily as needed for Constipation. 12/21/20     Meghan Peter MD   oxybutynin (DITROPAN) 5 mg tablet Take 1 Tab by mouth two (2) times a day.  1/11/13     Amadou Sutton MD   clobetasol (TEMOVATE) 0.05 % ointment Apply  to affected area two (2) times daily as needed for Skin Irritation or Itching. 1/11/13     Donn Briceño MD   phenazopyridine (PYRIDIUM) 200 mg tablet 1TIDPRNPA - TAKE ONE TABLET BY MOUTH THREE TIMES DAILY AS NEEDED FOR PAIN 1/2/11     Julio Cesar POOLE MD          A comprehensive review of systems was negative except for that written in the History of Present Illness. Visit Vitals  BP (!) 100/59   Pulse 86   Temp 98.5 °F (36.9 °C)   Resp 18   Ht 5' 6\" (1.676 m)   Wt 63.5 kg (140 lb)   SpO2 95%   BMI 22.60 kg/m²           Physical Assessment:     Wt Readings from Last 3 Encounters:   12/31/21 63.5 kg (140 lb)   12/17/20 71.3 kg (157 lb 3 oz)   01/11/13 60.9 kg (134 lb 3.2 oz)     Temp Readings from Last 3 Encounters:   12/31/21 98.5 °F (36.9 °C)   12/21/20 98 °F (36.7 °C)   01/11/13 98.6 °F (37 °C) (Oral)     BP Readings from Last 3 Encounters:   12/31/21 (!) 100/59   12/31/21 118/72   12/21/20 (!) 98/59     Pulse Readings from Last 3 Encounters:   12/31/21 86   12/31/21 93   12/21/20 64     Oral  Mucosa dry. Lungs : CTA. Heart S1 2 , , HAs AICD.  abdomen : Soft, NT,Positive BS,   Ankle : no edema. Has left PERC Nephrostomy tube.   Labs:     BMP:   Lab Results   Component Value Date/Time     12/31/2021 05:17 AM    K 3.3 (L) 12/31/2021 05:17 AM     (H) 12/31/2021 05:17 AM    CO2 20 (L) 12/31/2021 05:17 AM    AGAP 5 12/31/2021 05:17 AM     (H) 12/31/2021 05:17 AM    BUN 25 (H) 12/31/2021 05:17 AM    CREA 1.55 (H) 12/31/2021 05:17 AM    GFRAA 39 (L) 12/31/2021 05:17 AM    GFRNA 32 (L) 12/31/2021 05:17 AM          Collection Time: 12/31/21  5:17 AM   Result Value Ref Range     WBC 17.8 (H) 4.6 - 13.2 K/uL     RBC 4.13 (L) 4.20 - 5.30 M/uL     HGB 12.2 12.0 - 16.0 g/dL     HCT 37.1 35.0 - 45.0 %     MCV 89.8 78.0 - 100.0 FL     MCH 29.5 24.0 - 34.0 PG     MCHC 32.9 31.0 - 37.0 g/dL     RDW 13.7 11.6 - 14.5 %     PLATELET 403 174 - 305      Results for Salima De Anda (MRN [id number]) as of 12/31/2021 15:16   Ref. Range 12/31/2021 13:00   Color Latest Units:   YELLOW   Appearance Latest Units:   CLOUDY   Specific gravity Latest Ref Range: 1.005 - 1.030   1.016   pH (UA) Latest Ref Range: 5.0 - 8.0  >8.5 (H)   Protein Latest Ref Range: NEG mg/dL 100 (A)   Glucose Latest Ref Range: NEG mg/dL Negative   Ketone Latest Ref Range: NEG mg/dL Negative   Blood Latest Ref Range: NEG   SMALL (A)   Bilirubin Latest Ref Range: NEG   Negative   Urobilinogen Latest Ref Range: 0.2 - 1.0 EU/dL 1.0   Nitrites Latest Ref Range: NEG   Positive (A)   Leukocyte Esterase Latest Ref Range: NEG   SMALL (A)   Epithelial cells Latest Ref Range: 0 - 5 /lpf FEW   WBC Latest Ref Range: 0 - 4 /hpf 7 to 10   RBC Latest Ref Range: 0 - 5 /hpf Negative   Bacteria Latest Ref Range: NEG /hpf 2+ (A)       Imaging:     tomical Region Laterality Modality   Chest  Computed Tomography   Abdomen     Pelvis         Impression  Performed by RADIOLOGY    Postoperative changes cystectomy with right lower quadrant urostomy and ileal conduit creation. 5 mm proximal left ureteral calculus associated with mild/moderate left hydronephrosis, slightly delayed nephrogram, and perinephric fluid/infiltration. No acute cardiopulmonary process     Hysterectomy     Findings discussed the ordering service (Dr. Jeet Wall) following examination. Signed By: Juan Pace MD on 12/28/2021 2:18 PM    Narrative  Performed by RADIOLOGY  CT chest/abdomen/pelvis. History: Bladder cancer. Chemotherapy. Surgery. Comparison: None     Procedure: Following the administration of dilute oral and nonionic intravenous contrast, 5 mm axial images were obtained through the chest, abdomen, and pelvis. Sagittal and coronal reformats. 100 cc Visipaque 320. Findings:     Chest:     Left chest AICD. No pleural or pericardial effusion. Moderate coronary artery calcifications. No thoracic aortic aneurysm.  Right chest Mediport with catheter tip extending into the lower SVC. Multinodular thyroid with largest peripherally calcified nodule posterior right lobe measuring up to 12 mm. No pneumothorax or endobronchial lesion. No suspicious pulmonary parenchymal mass or consolidative changes. No suspicious thoracic bone lesion. Abdomen/pelvis:     No suspicious hepatic mass or biliary duct dilatation. Gallbladder unremarkable. Spleen normal in size. Right adrenal gland normal. Nodular thickening left adrenal gland. Right nephrogram normal. 5 mm proximal left ureteral calculus associated with mild to moderate hydronephrosis. Left nephrogram slightly delayed. Mild left perinephric infiltration and tracking fluid. Hysterectomy and cystectomy. Right lower quadrant ileal conduit with right-sided urostomy. No focal fluid or gas collection. Prominent aortic and iliac artery calcifications without aneurysm. No free air. No lymphadenopathy. Right hip screw and intramedullary aniceto with components of incomplete union moderately advanced degenerative changes upper lumbar spine. No additional discrete suspicious bone lesion identified. Procedure Note    Josh Douglas MD - 12/28/2021   Formatting of this note might be different from the original.   CT chest/abdomen/pelvis. History: Bladder cancer. Chemotherapy. Surgery. Comparison: None     Procedure: Following the administration of dilute oral and nonionic intravenous contrast, 5 mm axial images were obtained through the chest, abdomen, and pelvis. Sagittal and coronal reformats. 100 cc Visipaque 320. Findings:     Chest:     Left chest AICD. No pleural or pericardial effusion. Moderate coronary artery calcifications. No thoracic aortic aneurysm. Right chest Mediport with catheter tip extending into the lower SVC. Multinodular thyroid with largest peripherally calcified nodule posterior right lobe measuring up to 12 mm. No pneumothorax or endobronchial lesion.  No suspicious pulmonary parenchymal mass or consolidative changes. No suspicious thoracic bone lesion. Abdomen/pelvis:     No suspicious hepatic mass or biliary duct dilatation. Gallbladder unremarkable. Spleen normal in size. Right adrenal gland normal. Nodular thickening left adrenal gland. Right nephrogram normal. 5 mm proximal left ureteral calculus associated with mild to moderate hydronephrosis. Left nephrogram slightly delayed. Mild left perinephric infiltration and tracking fluid. Hysterectomy and cystectomy. Right lower quadrant ileal conduit with right-sided urostomy. No focal fluid or gas collection. Prominent aortic and iliac artery calcifications without aneurysm. No free air. No lymphadenopathy. Right hip screw and intramedullary aniceto with components of incomplete union moderately advanced degenerative changes upper lumbar spine. No additional discrete suspicious bone lesion identified. IMPRESSION     Postoperative changes cystectomy with right lower quadrant urostomy and ileal conduit creation. 5 mm proximal left ureteral calculus associated with mild/moderate left hydronephrosis, slightly delayed nephrogram, and perinephric fluid/infiltration. No acute cardiopulmonary process     Hysterectomy     Findings discussed the ordering service (Dr. Nikki Troy) following examination. Signed By: Terra Deleon MD on 12/28/2021 2:18 PM  Specimen Collected: 12/28/21  1:44 PM Last Resulted: 12/28/21  2:18 PM   Received From: [address]          Impression:   ARF.  ATN. Hypokalemia. Pyelonephritis. Leucocytosis. Dhhydration. S/P Left Nephrostomy tube placement. Plan:   Continue Hydration. Add K, . Follow Cultures & continue antibiotics. Urology needs to follow. Record Nephrostomy's output. [place] Avoid nephrotoxins & adjust medicine as per egfr. Will follow. Thanks.       Massiel Browne MD   W- [phone]  Cell : [phone]

## 2022-01-01 ENCOUNTER — APPOINTMENT (OUTPATIENT)
Dept: GENERAL RADIOLOGY | Age: 77
DRG: 871 | End: 2022-01-01
Attending: INTERNAL MEDICINE
Payer: MEDICARE

## 2022-01-01 VITALS
OXYGEN SATURATION: 93 % | SYSTOLIC BLOOD PRESSURE: 149 MMHG | WEIGHT: 140 LBS | HEIGHT: 66 IN | HEART RATE: 79 BPM | RESPIRATION RATE: 18 BRPM | DIASTOLIC BLOOD PRESSURE: 66 MMHG | BODY MASS INDEX: 22.5 KG/M2 | TEMPERATURE: 97.9 F

## 2022-01-01 PROBLEM — R09.02 HYPOXIA: Status: ACTIVE | Noted: 2022-01-01

## 2022-01-01 LAB
ALBUMIN SERPL-MCNC: 2.1 G/DL (ref 3.4–5)
ALBUMIN/GLOB SERPL: 0.7 {RATIO} (ref 0.8–1.7)
ALP SERPL-CCNC: 109 U/L (ref 45–117)
ALT SERPL-CCNC: 9 U/L (ref 13–56)
ANION GAP SERPL CALC-SCNC: 5 MMOL/L (ref 3–18)
AST SERPL-CCNC: 11 U/L (ref 10–38)
BASOPHILS # BLD: 0 K/UL (ref 0–0.1)
BASOPHILS NFR BLD: 0 % (ref 0–2)
BILIRUB SERPL-MCNC: 0.4 MG/DL (ref 0.2–1)
BUN SERPL-MCNC: 27 MG/DL (ref 7–18)
BUN/CREAT SERPL: 18 (ref 12–20)
CALCIUM SERPL-MCNC: 8.2 MG/DL (ref 8.5–10.1)
CHLORIDE SERPL-SCNC: 112 MMOL/L (ref 100–111)
CO2 SERPL-SCNC: 21 MMOL/L (ref 21–32)
CREAT SERPL-MCNC: 1.48 MG/DL (ref 0.6–1.3)
DIFFERENTIAL METHOD BLD: ABNORMAL
EOSINOPHIL # BLD: 0 K/UL (ref 0–0.4)
EOSINOPHIL NFR BLD: 0 % (ref 0–5)
ERYTHROCYTE [DISTWIDTH] IN BLOOD BY AUTOMATED COUNT: 14.4 % (ref 11.6–14.5)
GLOBULIN SER CALC-MCNC: 3 G/DL (ref 2–4)
GLUCOSE SERPL-MCNC: 93 MG/DL (ref 74–99)
HCT VFR BLD AUTO: 34.2 % (ref 35–45)
HGB BLD-MCNC: 11 G/DL (ref 12–16)
IMM GRANULOCYTES # BLD AUTO: 0.1 K/UL (ref 0–0.04)
IMM GRANULOCYTES NFR BLD AUTO: 1 % (ref 0–0.5)
LYMPHOCYTES # BLD: 0.7 K/UL (ref 0.9–3.6)
LYMPHOCYTES NFR BLD: 6 % (ref 21–52)
MAGNESIUM SERPL-MCNC: 1.9 MG/DL (ref 1.6–2.6)
MCH RBC QN AUTO: 29.6 PG (ref 24–34)
MCHC RBC AUTO-ENTMCNC: 32.2 G/DL (ref 31–37)
MCV RBC AUTO: 92.2 FL (ref 78–100)
MONOCYTES # BLD: 0.9 K/UL (ref 0.05–1.2)
MONOCYTES NFR BLD: 9 % (ref 3–10)
NEUTS SEG # BLD: 9.1 K/UL (ref 1.8–8)
NEUTS SEG NFR BLD: 84 % (ref 40–73)
NRBC # BLD: 0 K/UL (ref 0–0.01)
NRBC BLD-RTO: 0 PER 100 WBC
PLATELET # BLD AUTO: 118 K/UL (ref 135–420)
PMV BLD AUTO: 11.1 FL (ref 9.2–11.8)
POTASSIUM SERPL-SCNC: 3.5 MMOL/L (ref 3.5–5.5)
PROT SERPL-MCNC: 5.1 G/DL (ref 6.4–8.2)
RBC # BLD AUTO: 3.71 M/UL (ref 4.2–5.3)
SODIUM SERPL-SCNC: 138 MMOL/L (ref 136–145)
WBC # BLD AUTO: 10.9 K/UL (ref 4.6–13.2)

## 2022-01-01 PROCEDURE — 74011250636 HC RX REV CODE- 250/636: Performed by: STUDENT IN AN ORGANIZED HEALTH CARE EDUCATION/TRAINING PROGRAM

## 2022-01-01 PROCEDURE — 74011250636 HC RX REV CODE- 250/636: Performed by: INTERNAL MEDICINE

## 2022-01-01 PROCEDURE — 74011250637 HC RX REV CODE- 250/637: Performed by: STUDENT IN AN ORGANIZED HEALTH CARE EDUCATION/TRAINING PROGRAM

## 2022-01-01 PROCEDURE — 74011250637 HC RX REV CODE- 250/637: Performed by: UROLOGY

## 2022-01-01 PROCEDURE — 71046 X-RAY EXAM CHEST 2 VIEWS: CPT

## 2022-01-01 PROCEDURE — 36415 COLL VENOUS BLD VENIPUNCTURE: CPT

## 2022-01-01 PROCEDURE — 85025 COMPLETE CBC W/AUTO DIFF WBC: CPT

## 2022-01-01 PROCEDURE — 74011000250 HC RX REV CODE- 250: Performed by: STUDENT IN AN ORGANIZED HEALTH CARE EDUCATION/TRAINING PROGRAM

## 2022-01-01 PROCEDURE — 80053 COMPREHEN METABOLIC PANEL: CPT

## 2022-01-01 PROCEDURE — 99233 SBSQ HOSP IP/OBS HIGH 50: CPT | Performed by: INTERNAL MEDICINE

## 2022-01-01 PROCEDURE — 83735 ASSAY OF MAGNESIUM: CPT

## 2022-01-01 RX ORDER — IPRATROPIUM BROMIDE AND ALBUTEROL SULFATE 2.5; .5 MG/3ML; MG/3ML
3 SOLUTION RESPIRATORY (INHALATION)
Status: DISCONTINUED | OUTPATIENT
Start: 2022-01-01 | End: 2022-01-01 | Stop reason: HOSPADM

## 2022-01-01 RX ORDER — OXYCODONE AND ACETAMINOPHEN 5; 325 MG/1; MG/1
1 TABLET ORAL
Status: DISCONTINUED | OUTPATIENT
Start: 2022-01-01 | End: 2022-01-01 | Stop reason: HOSPADM

## 2022-01-01 RX ORDER — BUDESONIDE 0.5 MG/2ML
500 INHALANT ORAL
Status: DISCONTINUED | OUTPATIENT
Start: 2022-01-01 | End: 2022-01-01 | Stop reason: HOSPADM

## 2022-01-01 RX ORDER — ARFORMOTEROL TARTRATE 15 UG/2ML
15 SOLUTION RESPIRATORY (INHALATION)
Status: DISCONTINUED | OUTPATIENT
Start: 2022-01-01 | End: 2022-01-01 | Stop reason: HOSPADM

## 2022-01-01 RX ORDER — CHOLECALCIFEROL (VITAMIN D3) 125 MCG
5000 CAPSULE ORAL DAILY
Status: DISCONTINUED | OUTPATIENT
Start: 2022-01-02 | End: 2022-01-01 | Stop reason: HOSPADM

## 2022-01-01 RX ORDER — HYDROXYZINE PAMOATE 25 MG/1
25 CAPSULE ORAL 3 TIMES DAILY
Status: DISCONTINUED | OUTPATIENT
Start: 2022-01-01 | End: 2022-01-01 | Stop reason: HOSPADM

## 2022-01-01 RX ADMIN — POTASSIUM CHLORIDE AND SODIUM CHLORIDE: 900; 150 INJECTION, SOLUTION INTRAVENOUS at 05:58

## 2022-01-01 RX ADMIN — TAMSULOSIN HYDROCHLORIDE 0.4 MG: 0.4 CAPSULE ORAL at 08:24

## 2022-01-01 RX ADMIN — WATER 1 G: 1 INJECTION INTRAMUSCULAR; INTRAVENOUS; SUBCUTANEOUS at 06:07

## 2022-01-01 RX ADMIN — CITALOPRAM HYDROBROMIDE 20 MG: 20 TABLET ORAL at 08:24

## 2022-01-01 RX ADMIN — Medication 10 ML: at 06:06

## 2022-01-01 RX ADMIN — METOPROLOL TARTRATE 50 MG: 50 TABLET, FILM COATED ORAL at 08:24

## 2022-01-01 RX ADMIN — OXYBUTYNIN CHLORIDE 5 MG: 5 TABLET ORAL at 08:24

## 2022-01-01 NOTE — ROUTINE PROCESS
Bedside shift change report given to Cristhian Oneil (oncoming nurse) by Nasir Gregorio (offgoing nurse). Report included the following information SBAR, Kardex and MAR.

## 2022-01-01 NOTE — PROGRESS NOTES
Hospitalist Progress Note    Patient: Leticia Devine Age: 64 y.o. : 1945 MR#: [id number] SSN: [id number]  Date/Time: 2022 10:52 AM    DOA: 2021  PCP: Guru Schulte MD    Subjective:     She expressed anxiety due to hospitalization. She wants to go outside to smoke her cigarette. She has back pain. Otherwise, no fever. She tolerated her IV antibiotic   She underwent IR PCN placement yesterday. Renal function improved   Leukocytosis resolved     She expressed that she wanted to home. She has called her niece to come and pick her up. I explained to her that she needs to continue her hospitalization because she needs IV antibiotics for her infected kidney. I explained that if she leave today, this MD cannot appropriately give her antibiotics because her urine culture has not finalized. I explained that she could cause herself more harm and event death if she leave before her antibiotic complete. She expressed understanding of her risk of leaving against medical advise. She expressed that if she can sick, she will come back to the ER. Interval Hospital Course:        ROS:  No current fever/chills, no headache, no dizziness, no facial pain, no sinus congestion,   No swallowing pain, No chest pain, no palpitation, no shortness of breath, +abd pain,  No diarrhea, no urinary complaint, no leg pain or swelling      Assessment/Plan:   1. Sepsis POA (leukocytosis, tachycardia, pyelonephritis), resolved   2. Pyelonephritis   3. Obstructive uropathy ,s/p L PCN placed 21   4. Nephrolithiasis with 5 mm left ureteral calculus   5. H/o Urothelial carcinoma s/p cystectomy with ileal conduit   6. ALLISON with ATN associate with sepsis   7. Hypokalemia  8. Tobacco smoking dependence   9.    Anxiety      Cont IV antiibiotic, culture follow up for antibiotic selection   Cont L PCN, she needs urology follow up for further care and outpt management  Appreciate urology consult   Avoid nephrotoxic medication. Nephrology consult follows   Tobacco smoking cessation educated, she declined   Offer her anti anxiety medications but she declined   Percocet prn     Advised her risks associate with leaving AMA including worsening organ failure and death. Full code       Disposition plannin days   Family update (..none.)  Additional Notes:    Time spent > 35 minutes    Case discussed with:  [x]Patient  []Family  [x]Nursing  []Case Management  DVT Prophylaxis:  []Lovenox  []Hep SQ  [x]SCDs  []Coumadin   []On Heparin gtt    Signed By: Magdi Paredes MD     2022 10:52 AM              Objective:   VS:   Visit Vitals  /65   Pulse 85   Temp 98.3 °F (36.8 °C)   Resp 20   Ht 5' 6\" (1.676 m)   Wt 63.5 kg (140 lb)   SpO2 92%   BMI 22.60 kg/m²      Tmax/24hrs: Temp (24hrs), Av.1 °F (36.7 °C), Min:97.7 °F (36.5 °C), Max:98.5 °F (36.9 °C)      Intake/Output Summary (Last 24 hours) at 2022 1052  Last data filed at 2022 0605  Gross per 24 hour   Intake 2247.5 ml   Output 325 ml   Net 1922.5 ml       Tele:   General:  Cooperative, Not in acute distress, speaks in full sentence while in bed  HEENT: PERRL, EOMI, supple neck, no JVD, dry oral mucosa  Cardiovascular: S1S2 regular, no rub/gallop   Pulmonary: Clear air entry bilaterally, no wheezing, no crackle  GI:  Soft, non tender, non distended, +bs, no guarding, right sided conduit   Left PCN with good output  Extremities:  No pedal edema, +distal pulses appreciated   Neuro: AOx3, moving all extremities, no gross deficit.      Additional:       Current Facility-Administered Medications   Medication Dose Route Frequency    sodium chloride (NS) flush 5-40 mL  5-40 mL IntraVENous Q8H    sodium chloride (NS) flush 5-40 mL  5-40 mL IntraVENous PRN    acetaminophen (TYLENOL) tablet 650 mg  650 mg Oral Q6H PRN    Or    acetaminophen (TYLENOL) suppository 650 mg  650 mg Rectal Q6H PRN    polyethylene glycol (MIRALAX) packet 17 g  17 g Oral DAILY PRN    ondansetron (ZOFRAN ODT) tablet 4 mg  4 mg Oral Q8H PRN    Or    ondansetron (ZOFRAN) injection 4 mg  4 mg IntraVENous Q6H PRN    atorvastatin (LIPITOR) tablet 20 mg  20 mg Oral QHS    citalopram (CELEXA) tablet 20 mg  20 mg Oral DAILY    metoprolol tartrate (LOPRESSOR) tablet 50 mg  50 mg Oral Q12H    oxybutynin (DITROPAN) tablet 5 mg  5 mg Oral BID    cefTRIAXone (ROCEPHIN) 1 g in sterile water (preservative free) 10 mL IV syringe  1 g IntraVENous Q24H    morphine injection 2 mg  2 mg IntraVENous Q6H PRN    tamsulosin (FLOMAX) capsule 0.4 mg  0.4 mg Oral DAILY    influenza vaccine 2021-22 (6 mos+)(PF) (FLUARIX/FLULAVAL/FLUZONE QUAD) injection 0.5 mL  1 Each IntraMUSCular PRIOR TO DISCHARGE    0.9% sodium chloride with KCl 20 mEq/L infusion   IntraVENous CONTINUOUS     Facility-Administered Medications Ordered in Other Encounters   Medication Dose Route Frequency    flumazeniL (ROMAZICON) 0.1 mg/mL injection 0.2 mg  0.2 mg IntraVENous Rad Multiple    naloxone (NARCAN) injection 0.2 mg  0.2 mg IntraVENous Rad Multiple            Lab/Data Review:  Labs: Results:       Chemistry Recent Labs     01/01/22 0350 12/31/21  0517   GLU 93 133*    138   K 3.5 3.3*   * 113*   CO2 21 20*   BUN 27* 25*   CREA 1.48* 1.55*   BUCR 18 16   AGAP 5 5   CA 8.2* 8.2*     Recent Labs     01/01/22 0350 12/31/21  0517   ALT 9* 9*   TP 5.1* 5.8*   ALB 2.1* 2.4*   GLOB 3.0 3.4   AGRAT 0.7* 0.7*      CBC w/Diff Recent Labs     01/01/22 0350 12/31/21  0517 12/31/21  0517   WBC 10.9  --  17.8*   RBC 3.71*  --  4.13*   HGB 11.0*  --  12.2   HCT 34.2*  --  37.1   MCV 92.2   < > 89.8   MCH 29.6   < > 29.5   MCHC 32.2   < > 32.9   RDW 14.4   < > 13.7   *  --  136   BANDS  --   --  1   GRANS 84*  --  88*   LYMPH 6*  --  3*   EOS 0  --  0    < > = values in this interval not displayed.       Coagulation Recent Labs     12/31/21  0517   APTT 32.3       Iron/Ferritin No results found for: IRON, FE, TIBC, IBCT, PSAT, FERR    BNP    Cardiac Enzymes No results found for: CPK, RCK1, RCK2, RCK3, RCK4, CKMB, CKNDX, CKND1, TROPT, TROIQ, BNPP, BNP     Lactic Acid    Thyroid Studies          All Micro Results     Procedure Component Value Units Date/Time    CULTURE, URINE [[id number]] Collected: 12/31/21 1300    Order Status: Completed Specimen: Urine Updated: 12/31/21 2137            Images:    CT (Most Recent). CT ABDOMEN PELVIS W/O IV CONTRAST   Narrative   STUDY: CT of the abdomen and pelvis without IV contrast    INDICATION: Left flank pain     COMPARISON: 1/22/2020    FINDINGS: Routine CT images are obtained through the abdomen and pelvis without IV contrast. However, history does indicate the patient had a prior CT scan in the last couple days although not available on PACS archives. The liver, spleen, pancreas, gallbladder, adrenal glands appear unremarkable. The right kidney is unremarkable. There is a markedly abnormal appearance to the left kidney. There is patchy striated appearance with high density probably reflecting prior contrast. There is an approximate 5 mm stone within the left mid ureter with mild obstructive changes. The patient is status post cystectomy with ilial conduit. The bowel loops are nondistended. There is contrast within portions of the bowel presumed from prior diagnostic CT. Impression   IMPRESSION:  1. 5 mm stone within the left ureter with mild obstructive changes. There is also a very abnormal appearance to the left kidney with patchy striations and high density from previous contrast administration. Findings probably reflect decreased renal function from obstruction/possible ATN. Infection felt less likely. Please correlate. XRAY (Most Recent)      EKG No results found for this or any previous visit.      2D ECHO

## 2022-01-01 NOTE — PROGRESS NOTES
0250-Pt up in room. Pt un-hooked IV, took new nephrostomy bag loose and was trying to go out to smoke. Pt thinking it was 3pm. Re-oriented. Bed alarm on.

## 2022-01-01 NOTE — PROGRESS NOTES
Patient moved to room 502 to be closer to nursing station for safety. Attempted to contact Drea Rosa, niece and caregiver. Voicemail is full, could not leave a message for return call.

## 2022-01-01 NOTE — PROGRESS NOTES
Progress Note    [place] Imus  64 y.o. Admit Date: 12/31/2021  Active Problems:    Obstructive uropathy (12/31/2021) POA: Unknown      ARF (acute renal failure) ([facility] [address] (12/31/2021) POA: Unknown      ATN (acute tubular necrosis) ([facility] (12/31/2021) POA: Unknown      Pyelonephritis (12/31/2021) POA: Unknown      Hypokalemia (12/31/2021) POA: Unknown      Leucocytosis (12/31/2021) POA: Unknown      Dehydration (12/31/2021) POA: Unknown      Hypoxia (1/1/2022) POA: Unknown            Subjective:     Patient feels much better, Urine is Getting Clear. Through Left Nephrotomy Tube, no Nausea, no Vomiting, Off IVF. Wants to go home, if not discharged says she will go AMA. On IV antibiotics. A comprehensive review of systems was negative except for that written in the History of Present Illness.     Objective:     Visit Vitals  BP (!) 149/66   Pulse 79   Temp 97.9 °F (36.6 °C)   Resp 18   Ht 5' 6\" (1.676 m)   Wt 63.5 kg (140 lb)   SpO2 93%   BMI 22.60 kg/m²         Intake/Output Summary (Last 24 hours) at 1/1/2022 1313  Last data filed at 1/1/2022 8137  Gross per 24 hour   Intake 2247.5 ml   Output 325 ml   Net 1922.5 ml       Current Facility-Administered Medications   Medication Dose Route Frequency Provider Last Rate Last Admin    albuterol-ipratropium (DUO-NEB) 2.5 MG-0.5 MG/3 ML  3 mL Nebulization Q6HWA RT Samson Patel MD        budesonide (PULMICORT) 500 mcg/2 ml nebulizer suspension  500 mcg Nebulization BID RT Samson Patel MD        And    arformoteroL (BROVANA) neb solution 15 mcg  15 mcg Nebulization BID RT Samson Patel MD        hydrOXYzine pamoate (VISTARIL) capsule 25 mg  25 mg Oral TID Samson Patel MD        oxyCODONE-acetaminophen (PERCOCET) 5-325 mg per tablet 1 Tablet  1 Tablet Oral Q4H PRN Samson Ptael MD        [START ON 1/2/2022] cholecalciferol (VITAMIN D3) capsule 5,000 Units  5,000 Units Oral DAILY Samson Patel MD        sodium chloride (NS) flush 5-40 mL  5-40 mL IntraVENous Q8H Ruth Lewis, DO   10 mL at 01/01/22 0606    sodium chloride (NS) flush 5-40 mL  5-40 mL IntraVENous PRN Ruth Lewis DO        acetaminophen (TYLENOL) tablet 650 mg  650 mg Oral Q6H PRN Ruth Rolon,         Or    acetaminophen (TYLENOL) suppository 650 mg  650 mg Rectal Q6H PRN Ruth Rolon, DO        polyethylene glycol (MIRALAX) packet 17 g  17 g Oral DAILY PRN Ruth Rolon, DO        ondansetron (ZOFRAN ODT) tablet 4 mg  4 mg Oral Q8H PRN Ruth Rolon,         Or    ondansetron (ZOFRAN) injection 4 mg  4 mg IntraVENous Q6H PRN Ruth Rolon, DO        atorvastatin (LIPITOR) tablet 20 mg  20 mg Oral QHS Ruth Rolon, DO   20 mg at 12/31/21 2116    citalopram (CELEXA) tablet 20 mg  20 mg Oral DAILY Ruth Rolon, DO   20 mg at 01/01/22 0625    metoprolol tartrate (LOPRESSOR) tablet 50 mg  50 mg Oral Q12H Ruth Rolon, DO   50 mg at 01/01/22 0824    oxybutynin (DITROPAN) tablet 5 mg  5 mg Oral BID Ruth Rolon DO   5 mg at 01/01/22 3413    cefTRIAXone (ROCEPHIN) 1 g in sterile water (preservative free) 10 mL IV syringe  1 g IntraVENous Q24H Nini Guan MD   1 g at 01/01/22 0081    morphine injection 2 mg  2 mg IntraVENous Q6H PRN Tereasa Lanes N, DO        tamsulosin (FLOMAX) capsule 0.4 mg  0.4 mg Oral DAILY Suha Mendoza MD   0.4 mg at 01/01/22 8087    influenza vaccine 2021-22 (6 mos+)(PF) (FLUARIX/FLULAVAL/FLUZONE QUAD) injection 0.5 mL  1 Each IntraMUSCular PRIOR TO DISCHARGE Nini Guan MD        0.9% sodium chloride with KCl 20 mEq/L infusion   IntraVENous CONTINUOUS Jose Degroot MD   Stopped at 01/01/22 1200     Facility-Administered Medications Ordered in Other Encounters   Medication Dose Route Frequency Provider Last Rate Last Admin    naloxone (NARCAN) injection 0.2 mg  0.2 mg IntraVENous Rad Multiple Lieutenant Lacy Farooq PA            Physical Exam:     Physical Exam:   General:  Alert, cooperative, no distress, appears stated age. Mouth/Throat: Lips, mucosa, and tongue normal. Teeth and gums normal.   Neck: Supple, symmetrical, trachea midline, no adenopathy, thyroid: no enlargement/tenderness/nodules, no carotid bruit and no JVD. Lungs:   Clear to auscultation bilaterally. Heart:  Regular rate and rhythm, S1, S2 normal, no murmur, click, rub or gallop. Abdomen:   Soft, non-tender. Bowel sounds normal. No masses,  No organomegaly. Has Colostomy, site is well dressed. , Has Left Nephrotomy Tube , bag has some blood tinged Urine. Extremities: Extremities normal, atraumatic, no cyanosis or edema. Data Review:    CBC w/Diff    Recent Labs     01/01/22 0350 12/31/21 0517 12/31/21 0517   WBC 10.9  --  17.8*   RBC 3.71*  --  4.13*   HGB 11.0*  --  12.2   HCT 34.2*  --  37.1   MCV 92.2   < > 89.8   MCH 29.6   < > 29.5   MCHC 32.2   < > 32.9   RDW 14.4   < > 13.7    < > = values in this interval not displayed. Recent Labs     01/01/22 0350 12/31/21 0517 12/31/21 0517   BANDS  --   --  1   MONOS 9  --  8   EOS 0  --  0   BASOS 0   < > 0   RDW 14.4   < > 13.7    < > = values in this interval not displayed. Comprehensive Metabolic Profile    Recent Labs     01/01/22 0350 12/31/21 0517    138   K 3.5 3.3*   * 113*   CO2 21 20*   BUN 27* 25*   CREA 1.48* 1.55*    Recent Labs     01/01/22 0350 12/31/21  0517   CA 8.2* 8.2*   ALB 2.1* 2.4*   TP 5.1* 5.8*   TBILI 0.4 0.5                        Impression:       Active Hospital Problems    Diagnosis Date Noted    Hypoxia 01/01/2022    Obstructive uropathy 12/31/2021    ARF (acute renal failure) ([address] 12/31/2021    [facility] necrosis[facility] 12/31/2021    Pyelonephritis 12/31/2021    Hypokalemia 12/31/2021    Leucocytosis 12/31/2021    Dehydration 12/31/2021        K has Normalized, no more Dehydration following Hydration, no significant Post Obstructive Diuresis,Continue Antibiotics. Renal function seems at her baseline.     Plan:     If she leaves AMA she will need some Oral Antibiotics for Pyelonephritis.       Krzysztof Tavarez MD

## 2022-01-01 NOTE — PROGRESS NOTES
While performing rounds Nursing entered room to find patient had pulled second IV from arm and is now stating she \"Needs to go smoke and if we will not let her smoke she will call Suri Ngo to come pick her up. \" Patient educated on importance of remaining for duration of treatment and the importance of IV sites. She verbalized understanding however would not allow nursing to start another IV site saying \"I am leaving and I don't give a damn what you or anyone else says I am leaving so I can have a cigarette. \"

## 2022-01-01 NOTE — CONSULTS
ASSESSMENT:   Nephrolithiasis   5mm left ureteral calculus   L PCN placed 12/31/21. Draining well. Pyelonephritis   Afebrile. Cultures pending. On rocephin. H/o urothelial carcinoma   S/p cystectomy with ileal conduit on 8/15/2019 with Dr. Caitlyn Arroyo   No follow up since early 2020. No evidence of recurrent disease on CT 12/20 per report (vidant -- no images available)    Elevated Creatinine   Cr 1.9 from Baseline 1.4      PLAN:    Left PCN to gravity  Antibiotics per hospitalist.  Taper to culture results. Recommend 10 day course. She should follow up with Dr Davon Huerta for bladder cancer follow up and definitive left ureter stone management. Will sign off. Please call if needed further. Tasha Palacios MD    [phone]    January 1, 2022 6:25 AM        Chief Complaint   Patient presents with    Acute Kidney Injury       HISTORY OF PRESENT ILLNESS:  Eduardo Jones is a 64 y.o. female who is seen in consultation as referred by Dr. Sruthi Gagnon  for left ureteral calculus in patient with ileal conduit. She developed left flank pain approx 3 days ago and was imaged at [facility] and found to have a 5 mm ureteral calculus. She underwent repeat imaging after presenting to the ER and found to have persistent stone and striation in the renal parenchyma. She was transferred as no beds available at the outside hospital.      Left PCN was placed yesterday by interventional radiology. She is tolerating the drain well. She has a history of a cystectomy by Dr. Davon Huerta in [place] [address]. This was done in 2019. She has had no follow-up since then she states due to Covid related scheduling problems.       Past Medical History:   Diagnosis Date    Arthritis     Knees, Back, Hands    Bladder cancer ([facility] [address]     Chronic obstructive pulmonary disease ([facility] [address]     Depression     Hypertension     Sick sinus syndrome [facility])        Past Surgical History:   Procedure Laterality Date  DILATION AND CURETTAGE      HX APPENDECTOMY      HX GYN      Hysterctomy    HX ORTHOPAEDIC      Ankle     HX OTHER SURGICAL      Left ankle plate and screws.  HX PACEMAKER      HX UROLOGICAL      Bladder resection with ileal conduit reconstruction for ureterostomy's    IR NEPHROSTOMY [facility] CATH  SI  12/31/2021    PELVIS/HIP JOINT SURGERY UNLISTED      Plate and screws right hip    TX ABDOMEN SURGERY PROC UNLISTED      Urostomy    TX ABDOMEN SURGERY PROC UNLISTED      Apendectomy    TX CARDIAC SURG PROCEDURE UNLIST         Social History     Tobacco Use    Smoking status: Current Every Day Smoker     Packs/day: 2.00     Years: 62.00     Pack years: 124.00    Smokeless tobacco: Never Used    Tobacco comment: since 14 yrs. old   Vaping Use    Vaping Use: Never used   Substance Use Topics    Alcohol use: Not Currently     Comment: occational    Drug use: Never       Allergies   Allergen Reactions    Tetracycline Unknown (comments)       Family History   Problem Relation Age of Onset    Hypertension Mother     Stroke Mother     COPD Mother     Kidney Disease Mother     Cancer Father         Prostate    Hypertension Sister     Other Brother         Hodgkin's lymphoma       Current Facility-Administered Medications   Medication Dose Route Frequency Provider Last Rate Last Admin    sodium chloride (NS) flush 5-40 mL  5-40 mL IntraVENous Q8H Ruth Rolon, DO   10 mL at 01/01/22 0606    sodium chloride (NS) flush 5-40 mL  5-40 mL IntraVENous PRN Ruth Rolon, DO        acetaminophen (TYLENOL) tablet 650 mg  650 mg Oral Q6H PRN Ruth Rolon,         Or    acetaminophen (TYLENOL) suppository 650 mg  650 mg Rectal Q6H PRN Ruth Rolon, DO        polyethylene glycol (MIRALAX) packet 17 g  17 g Oral DAILY PRN Ruth Rolon, DO        ondansetron (ZOFRAN ODT) tablet 4 mg  4 mg Oral Q8H PRN Ruth Rolon, DO        Or    ondansetron (ZOFRAN) injection 4 mg  4 mg IntraVENous Q6H PRN Colleen Milligan, DO        atorvastatin (LIPITOR) tablet 20 mg  20 mg Oral QHS Ruth Rolon, DO   20 mg at 12/31/21 2116    citalopram (CELEXA) tablet 20 mg  20 mg Oral DAILY Ruth Rolon, DO   20 mg at 01/01/22 1014    metoprolol tartrate (LOPRESSOR) tablet 50 mg  50 mg Oral Q12H Ruth Rolon, DO   50 mg at 01/01/22 8746    oxybutynin (DITROPAN) tablet 5 mg  5 mg Oral BID [place] Ruth Mccollum, DO   5 mg at 01/01/22 5338    cefTRIAXone (ROCEPHIN) 1 g in sterile water (preservative free) 10 mL IV syringe  1 g IntraVENous Q24H Ruth Rolon, DO   1 g at 01/01/22 0607    morphine injection 2 mg  2 mg IntraVENous Q6H PRN Pita Bazzi N, DO        tamsulosin (FLOMAX) capsule 0.4 mg  0.4 mg Oral DAILY Christiano Robertson MD   0.4 mg at 01/01/22 1210    influenza vaccine 2021-22 (6 mos+)(PF) (FLUARIX/FLULAVAL/FLUZONE QUAD) injection 0.5 mL  1 Each IntraMUSCular PRIOR TO DISCHARGE Krishan Wells MD        0.9% sodium chloride with KCl 20 mEq/L infusion   IntraVENous CONTINUOUS Choco White MD 75 mL/hr at 01/01/22 0558 New Bag at 01/01/22 0558     Facility-Administered Medications Ordered in Other Encounters   Medication Dose Route Frequency Provider Last Rate Last Admin    flumazeniL (ROMAZICON) 0.1 mg/mL injection 0.2 mg  0.2 mg IntraVENous Rad Jonn Pond PA        naloxone [facility]) injection 0.2 mg  0.2 mg IntraVENous Rad Jonn Pond PA           Review of Systems  Constitutional: Fever: No  Skin: Rash: No  HEENT: Hearing difficulty: No  Eyes: Blurred vision: No  Cardiovascular: Chest pain: No  Respiratory: Shortness of breath: No  Gastrointestinal: Nausea/vomiting: No  Musculoskeletal: Back pain: No  Neurological: Weakness: No  Psychological: Memory loss: No  Comments/additional findings:   All other systems reviewed and are negative        PHYSICAL EXAMINATION:     Visit Vitals  /65   Pulse 85   Temp 98.3 °F (36.8 °C)   Resp 20   Ht 5' 6\" (1.676 m)   Wt 63.5 kg (140 lb)   SpO2 92%   BMI 22.60 kg/m²     Constitutional: Well developed, well-nourished female in no acute distress. HEENT: Normocephalic, Atraumatic   CV:   no edema   Respiratory: No respiratory distress or difficulties breathing   Abdomen:  Soft and nontender. +ileal conduit, stoma pink   Female: left PCN draining yellow urine  Skin:  Normal color. No evidence of jaundice. Neuro/Psych:  Patient with appropriate affect. Alert and oriented.         REVIEW OF LABS AND IMAGING:         Labs: Results:   Chemistry    Recent Labs     01/01/22  0350 12/31/21  0517   GLU 93 133*    138   K 3.5 3.3*   * 113*   CO2 21 20*   BUN 27* 25*   CREA 1.48* 1.55*   CA 8.2* 8.2*   AGAP 5 5   BUCR 18 16    128*   TP 5.1* 5.8*   ALB 2.1* 2.4*   GLOB 3.0 3.4   AGRAT 0.7* 0.7*      CBC w/Diff Recent Labs     01/01/22  0350 12/31/21  0517   WBC 10.9 17.8*   RBC 3.71* 4.13*   HGB 11.0* 12.2   HCT 34.2* 37.1   * 136   GRANS 84* 88*   LYMPH 6* 3*   EOS 0 0      Cultures Recent Labs     12/31/21  1220   CULT PENDING     All Micro Results     Procedure Component Value Units Date/Time    CULTURE, URINE [[id number]] Collected: 12/31/21 1300    Order Status: Completed Specimen: Urine Updated: 12/31/21 2137            Urinalysis Color   Date Value Ref Range Status   12/31/2021 YELLOW   Final     Appearance   Date Value Ref Range Status   12/31/2021 CLOUDY   Final     Specific gravity   Date Value Ref Range Status   12/31/2021 1.016 1.005 - 1.030   Final     pH (UA)   Date Value Ref Range Status   12/31/2021 >8.5 (H) 5.0 - 8.0 Final     Protein   Date Value Ref Range Status   12/31/2021 100 (A) NEG mg/dL Final     Ketone   Date Value Ref Range Status   12/31/2021 Negative NEG mg/dL Final     Bilirubin   Date Value Ref Range Status   12/31/2021 Negative NEG   Final     Blood   Date Value Ref Range Status   12/31/2021 SMALL (A) NEG   Final     Urobilinogen   Date Value Ref Range Status 12/31/2021 1.0 0.2 - 1.0 EU/dL Final     Nitrites   Date Value Ref Range Status   12/31/2021 Positive (A) NEG   Final     Leukocyte Esterase   Date Value Ref Range Status   12/31/2021 SMALL (A) NEG   Final     Potassium   Date Value Ref Range Status   01/01/2022 3.5 3.5 - 5.5 mmol/L Final     Creatinine   Date Value Ref Range Status   01/01/2022 1.48 (H) 0.6 - 1.3 MG/DL Final     BUN   Date Value Ref Range Status   01/01/2022 27 (H) 7.0 - 18 MG/DL Final      Coagulation Lab Results   Component Value Date/Time    aPTT 32.3 12/31/2021 05:17 AM           CT 12/30/21 per report -- 5 mm left ureter stone. No metastatic or recurrent bladder ca noted per report.

## 2022-01-01 NOTE — PROGRESS NOTES
Patient discharged AMA with niece present. Patient unable to ambulate to car therefore a wheel chair was used to transport to car. Patient signed AMA paper work and stated \"I know what it is. This ain't my first time. I've done this 100 times before. \" Nephrostomy tube and bag remained in place upon departure from hospital.

## 2022-01-02 LAB
BACTERIA SPEC CULT: ABNORMAL
BACTERIA SPEC CULT: ABNORMAL
BACTERIA SPEC CULT: NORMAL
CC UR VC: ABNORMAL
SERVICE CMNT-IMP: ABNORMAL
SERVICE CMNT-IMP: NORMAL

## 2022-01-02 NOTE — DISCHARGE SUMMARY
Discharge Summary    Patient: Leticia Devine               Sex: female          DOA: 12/31/2021         YOB: 1945      Age:  64 y.o.        LOS:  LOS: 1 day                Admit Date: 12/31/2021    Discharge Date: 1/1/2022    Primary care physician: Eris, MD Guru    Discharge Diagnoses:      THIS PATIENT LEFT AMA   1. Sepsis POA (leukocytosis, tachycardia, pyelonephritis), resolved   2. Pyelonephritis   3. Obstructive uropathy ,s/p L PCN placed 12/31/21   4. Nephrolithiasis with 5 mm left ureteral calculus   5. H/o Urothelial carcinoma s/p cystectomy with ileal conduit   6. ALLISON with ATN associate with sepsis   7. Hypokalemia  8. Tobacco smoking dependence   9. Anxiety   10. Hypoxia          Hospital Course: *  10-year-old  female with history of bladder cancer status post cystectomy with ileal conduit, hypertension, COPD, tobacco smoking dependence, depression anxiety, presented from [place] after ER to ER transfer for further urological evaluation. Apparently she was experiencing 3 days of nausea and vomiting with anorexia. In the ER CT abdomen and pelvic show 5 mm stone in the left ureter with left kidney patchy striation concerning for obstructive uropathy, pyelonephritis. She was also found to have ALLISON. Due to lack of care and hospital bed she was transferred to [facility] for further care. She continue on IV antibiotics. On admission she was found to have sepsis including leukocytosis, tachycardia, pyelonephritis. IR consulted with placement of PCN on 12-31-21. Her sepsis resolved with antibiotics and intervention. Nephrology consulted for further care. Urology evaluated her this morning and recommended outpatient care for her stone. She expressed anxiety due to hospitalization. She wants to go outside to smoke her cigarette. She has back pain. Otherwise, no fever. She tolerated her IV antibiotic   She underwent IR PCN placement yesterday.    Renal function improved   Leukocytosis resolved      She expressed that she wanted to home. She has called her niece to come and pick her up. I explained to her that she needs to continue her hospitalization because she needs IV antibiotics for her infected kidney. I explained that if she leave today, this MD cannot appropriately give her antibiotics because her urine culture has not finalized. I explained that she could cause herself more harm and event death if she leave before her antibiotic complete. She expressed understanding of her risk of leaving against medical advise. She expressed that if she can sick, she will come back to the ER. Her nursing call to report that her family member came to . Nursing staff explained to them the risks associated with leaving [address] which including further injury to her kidneys and possibly death. Patient signed AMA paper and left the hospital with her family    Consults: *  Urology: Dr. Yajaira Serrano  Nephrology: Dr. Sarai Lugo     Significant Diagnostic Studies:    Lab/Data Review:  Labs: Results:       Chemistry Recent Labs     01/01/22 0350 12/31/21  0517   GLU 93 133*    138   K 3.5 3.3*   * 113*   CO2 21 20*   BUN 27* 25*   CREA 1.48* 1.55*   CA 8.2* 8.2*   AGAP 5 5   BUCR 18 16    128*   TP 5.1* 5.8*   ALB 2.1* 2.4*   GLOB 3.0 3.4   AGRAT 0.7* 0.7*      CBC w/Diff Recent Labs     01/01/22 0350 12/31/21  0517   WBC 10.9 17.8*   RBC 3.71* 4.13*   HGB 11.0* 12.2   HCT 34.2* 37.1   * 136   GRANS 84* 88*   LYMPH 6* 3*   EOS 0 0      Coagulation Recent Labs     12/31/21  0517   APTT 32.3       Iron/Ferritin No results for input(s): IRON in the last 72 hours. No lab exists for component: TIBCCALC   BNP No results for input(s): BNPP in the last 72 hours. Cardiac Enzymes No results for input(s): CPK, CKND1, ROWAN in the last 72 hours.     No lab exists for component: CKRMB, TROIP   Liver Enzymes Recent Labs     01/01/22  0350   TP 5.1*   ALB 2.1*         Thyroid Studies No results for input(s): T4, T3U, TSH, TSHEXT in the last 72 hours.     No lab exists for component: T3RU       All Micro Results     Procedure Component Value Units Date/Time    CULTURE, URINE [[id number]]  (Abnormal) Collected: 12/31/21 1300    Order Status: Completed Specimen: Urine Updated: 01/01/22 1723     Special Requests: NO SPECIAL REQUESTS        Olean Count --        >100,000  COLONIES/mL       Culture result:       POSSIBLE ENTEROCOCCUS SPECIES                  MIXED UROGENITAL MADIHA ISOLATED                      Medications at discharge  including reasons for change and indications for new ones:   Discharge Medication List as of 1/1/2022  1:53 PM                  Pending laboratory work and tests: final urine culture         Time spent >30 minutes  Elizabeth Alvarez MD  1/1/2022  7:06 PM

## 2022-01-03 LAB
BACTERIA SPEC CULT: ABNORMAL
BACTERIA SPEC CULT: ABNORMAL
GRAM STN SPEC: ABNORMAL
SERVICE CMNT-IMP: ABNORMAL

## 2022-01-04 NOTE — PROGRESS NOTES
Received message from Rozina at [facility]. Asking for physician's name that patient needed to follow up with to have her stitches removed. Told her the nephrologist that saw her here was Dr Yasir Dillon; have her his office number.   Nohemi Eldridge RN - Outcomes Manager  [phone]

## 2022-01-06 NOTE — ED PROVIDER NOTES
Late entry - my original note was lost.    The patient is a 65 y/o woman transferred from [place] in [place], [place] because this hospital is the closest facility available with Urological services. She is a 65 y/o woman who was seen in [place] after presenting with nausea, vomiting and decreased appetite for the past 3 days. She did have a CT done there that showed a 5 mm stone in the left ureter. They were concerned about the possibility of urosepsis secondary to obstructing nephrolithiasis along with acute kidney injury. Dr. Roge Scott, from urology, is willing to accept the patient for further urological intervention. The patient has a history of bladder cancer and she is status post urostomy. She does have a urologist at [facility] and [facility] but that [facility] is on diversion secondary to patient volumes. Past Medical History:   Diagnosis Date    Arthritis     Knees, Back, Hands    Bladder cancer ([address]     Chronic obstructive pulmonary disease ([address]     Depression     Hypertension     Sick sinus syndrome ([address]        Past Surgical History:   Procedure Laterality Date    DILATION AND CURETTAGE      HX APPENDECTOMY      HX GYN      Hysterctomy    HX ORTHOPAEDIC      Ankle     HX OTHER SURGICAL      Left ankle plate and screws.      HX PACEMAKER      HX UROLOGICAL      Bladder resection with ileal conduit reconstruction for ureterostomy's    IR NEPHROSTOMY [facility] CATH  SI  12/31/2021    PELVIS/HIP JOINT SURGERY UNLISTED      Plate and screws right hip    GA ABDOMEN SURGERY PROC UNLISTED      Urostomy    GA ABDOMEN SURGERY PROC UNLISTED      Apendectomy    GA CARDIAC SURG PROCEDURE UNLIST           Family History:   Problem Relation Age of Onset    Hypertension Mother     Stroke Mother     COPD Mother     Kidney Disease Mother     Cancer Father         Prostate    Hypertension Sister     Other Brother         Hodgkin's lymphoma       Social History     Socioeconomic History    Marital status: SINGLE     Spouse name: Not on file    Number of children: Not on file    Years of education: Not on file    Highest education level: Not on file   Occupational History    Not on file   Tobacco Use    Smoking status: Current Every Day Smoker     Packs/day: 2.00     Years: 62.00     Pack years: 124.00    Smokeless tobacco: Never Used    Tobacco comment: since 14 yrs. old   Vaping Use    Vaping Use: Never used   Substance and Sexual Activity    Alcohol use: Not Currently     Comment: occational    Drug use: Never    Sexual activity: Not on file   Other Topics Concern     Service No    Blood Transfusions Yes    Caffeine Concern Yes    Occupational Exposure No    Hobby Hazards No    Sleep Concern No    Stress Concern No    Weight Concern No    Special Diet No    Back Care No    Exercise Yes    Bike Helmet No    Seat Belt Yes    Self-Exams No   Social History Narrative    Not on file     Social Determinants of Health     Financial Resource Strain: Medium Risk    Difficulty of Paying Living Expenses: Somewhat hard   Food Insecurity: No Food Insecurity    Worried About Running Out of Food in the Last Year: Never true    Jonathon of Food in the Last Year: Never true   Transportation Needs: No Transportation Needs    Lack of Transportation (Medical): No    Lack of Transportation (Non-Medical):  No   Physical Activity: Sufficiently Active    Days of Exercise per Week: 7 days    Minutes of Exercise per Session: 30 min   Stress: Stress Concern Present    Feeling of Stress : Rather much   Social Connections: Socially Isolated    Frequency of Communication with Friends and Family: Once a week    Frequency of Social Gatherings with Friends and Family: Never    Attends Synagogue Services: Never    Active Member of Clubs or Organizations: No    Attends Club or Organization Meetings: Never    Marital Status: Never    Intimate Partner Violence: Not At Risk    Fear of Current or Ex-Partner: No    Emotionally Abused: No    Physically Abused: No    Sexually Abused: No   Housing Stability: Unknown    Unable to Pay for Housing in the Last Year: No    Number of Places Lived in the Last Year: Not on file    Unstable Housing in the Last Year: No         ALLERGIES: Tetracycline    Review of Systems   All other systems reviewed and are negative. Vitals:    12/31/21 2341 01/01/22 0322 01/01/22 0723 01/01/22 1220   BP: (!) 114/53 121/86 133/65 (!) 149/66   Pulse: 86 91 85 79   Resp: 20 20 20 18   Temp: 97.7 °F (36.5 °C) 97.9 °F (36.6 °C) 98.3 °F (36.8 °C) 97.9 °F (36.6 °C)   SpO2: 92% 91% 92% 93%   Weight:       Height:                Physical Exam  Vitals and nursing note reviewed. Constitutional:       Appearance: She is normal weight. HENT:      Head: Normocephalic and atraumatic. Right Ear: External ear normal.      Left Ear: External ear normal.      Nose: Nose normal.      Mouth/Throat:      Mouth: Mucous membranes are moist.      Pharynx: Oropharynx is clear. Eyes:      Extraocular Movements: Extraocular movements intact. Conjunctiva/sclera: Conjunctivae normal.      Pupils: Pupils are equal, round, and reactive to light. Cardiovascular:      Rate and Rhythm: Normal rate and regular rhythm. Pulses: Normal pulses. Heart sounds: Normal heart sounds. Pulmonary:      Effort: Pulmonary effort is normal.      Comments: Coarse breath sounds throughout all lung fields  Abdominal:      General: Abdomen is flat. Bowel sounds are normal.      Palpations: Abdomen is soft. Tenderness: There is abdominal tenderness. Musculoskeletal:         General: Normal range of motion. Cervical back: Normal range of motion and neck supple. Skin:     General: Skin is warm and dry. Capillary Refill: Capillary refill takes less than 2 seconds. Neurological:      General: No focal deficit present.       Mental Status: She is alert and oriented to person, place, and time. Psychiatric:         Mood and Affect: Mood normal.         Behavior: Behavior normal.         Thought Content: Thought content normal.         Judgment: Judgment normal.          MDM  Number of Diagnoses or Management Options  ALLISON (acute kidney injury) ([facility]  Obstructive uropathy  Sepsis with acute renal failure without septic shock, due to unspecified organism, unspecified acute renal failure type [facility])  Diagnosis management comments: Patient is a 47-year-old woman who was transferred from a critical access hospital in [facility] to [facility] for urological intervention by Dr. Sheila Green since we are the closest facility that has any capabilities at this time. I have consulted the hospitalist for admission and we have notified Dr. Sheila Green that the patient has arrived. She did receive IV fluids and IV antibiotics at the facility in [place] prior to transfer. ED Course as of 01/13/22 1948   Thu Jan 13, 2022 1948 CULTURE, URINE(!):    Special Requests: NO SPECIAL REQUESTS   [place] Count >100,000  COLONIES/mL     Culture result: ENTEROCOCCUS FAECALIS (PREDOMINATING)(!)   Culture result: MIXED UROGENITAL MADIHA ISOLATED [EB]   1948 CBC WITH AUTOMATED DIFF(!):    WBC 17.8(!)   RBC 4.13(!)   HGB 12.2   HCT 37.1   MCV 89.8   MCH 29.5   MCHC 32.9   RDW 13.7   PLATELET 599   MPV 80.2   NRBC 0.0   ABSOLUTE NRBC 0.00   NEUTROPHILS 88(!)   BAND NEUTROPHILS 1   LYMPHOCYTES 3(!)   MONOCYTES 8   EOSINOPHILS 0   BASOPHILS 0   IMMATURE GRANULOCYTES 0   ABS. NEUTROPHILS 15.9(!)   ABS. LYMPHOCYTES 0.5(!)   ABS. MONOCYTES 1.4(!)   ABS. EOSINOPHILS 0.0   ABS. BASOPHILS 0.0   ABS. IMM.  GRANS. 0.0   DF MANUAL   PLATELET COMMENTS ADEQUATE PLATELETS   RBC COMMENTS NORMOCYTIC, NORMOCHROMIC [EB]   1948 URINALYSIS W/ RFLX MICROSCOPIC(!):    Color YELLOW   Appearance CLOUDY   Specific gravity 1.016   pH (UA) >8.5(!)   Protein 100(!)   Glucose Negative   Ketone Negative Bilirubin Negative   Blood SMALL(!)   Urobilinogen 1.0   Nitrites Positive(!)   Leukocyte Esterase SMALL(!) [EB]   1948 COMPREHENSIVE METABOLIC PANEL(!):    Sodium 138   Potassium 3.3(!)   Chloride 113(!)   CO2 20(!)   Anion gap 5   Glucose 133(!)   BUN 25(!)   Creatinine 1.55(!)   BUN/Creatinine ratio 16   GFR est AA 39(!)   GFR est non-AA 32(!)   Calcium 8.2(!)   Bilirubin, total 0.5   ALT 9(!)   AST 10   Alk.  phosphatase 128(!)   Protein, total 5.8(!)   Albumin 2.4(!)   Globulin 3.4   A-G Ratio 0.7(!) [EB]   1948 AMB POC EKG ROUTINE W/ 12 LEADS, INTER & REP [EB]      ED Course User Index  [EB] Brandon Barillas MD       Critical Care  Performed by: Brandon Barillas MD  Authorized by: Brandon Barillas MD     Critical care provider statement:     Critical care time (minutes):  35    Critical care time was exclusive of:  Separately billable procedures and treating other patients    Critical care was necessary to treat or prevent imminent or life-threatening deterioration of the following conditions:  Cardiac failure, circulatory failure, CNS failure or compromise, renal failure and sepsis    Critical care was time spent personally by me on the following activities:  Development of treatment plan with patient or surrogate, discussions with consultants, evaluation of patient's response to treatment, examination of patient, obtaining history from patient or surrogate, ordering and performing treatments and interventions, ordering and review of laboratory studies, ordering and review of radiographic studies, pulse oximetry, re-evaluation of patient's condition and review of old charts    I assumed direction of critical care for this patient from another provider in my specialty: no